# Patient Record
Sex: FEMALE | Race: WHITE | NOT HISPANIC OR LATINO | Employment: UNEMPLOYED | URBAN - METROPOLITAN AREA
[De-identification: names, ages, dates, MRNs, and addresses within clinical notes are randomized per-mention and may not be internally consistent; named-entity substitution may affect disease eponyms.]

---

## 2020-12-07 NOTE — PROGRESS NOTES
"Subjective   Chief Complaint   Patient presents with   • Fatigue   • Chest Pain   • Joint Pain   • Shortness of Breath   • Illness     POST-COVID       History of Present Illness   30 y.o. female presents as a new patient to establish care and discuss multiple issues starting after COVID infection in March. She had myalgias and arthralgias with initial infection that have unfortunately continued. In April she developed dyspnea and CP--went to ER--negative CXR normal. Dyspnea and CP continued worsening in May with pain radiating down her left arm so returned to ER and subsequently had 2D echo and nuclear stress test negative for ischemia. \"Feels like I have lacerations across my lungs sometimes.\" Chest pain and dyspnea have improved since then but not resolved. Had lab evaluation in September with previous PCP. Advised she was VD deficient. Now taking it OTC. Also has sinus congestion and pain in her ears--\"like a sore throat but in my ears.\" Pain in chest worse with dyspnea. 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      There is no problem list on file for this patient.      Allergies   Allergen Reactions   • Penicillins Rash       No current outpatient medications on file prior to visit.     No current facility-administered medications on file prior to visit.        Past Medical History:   Diagnosis Date   • Hashimoto's disease    • Hashimoto's thyroiditis        Family History   Problem Relation Age of Onset   • Cancer Father    • Heart disease Maternal Grandmother    • Heart disease Maternal Grandfather        Social History     Socioeconomic History   • Marital status:      Spouse name: Not on file   • Number of children: Not on file   • Years of education: Not on file   • Highest education level: Not on file   Tobacco Use   • Smoking status: Never Smoker   • Smokeless tobacco: Never Used   Substance and Sexual Activity   • Alcohol use: Never     Frequency: Never " "      Past Surgical History:   Procedure Laterality Date   •  SECTION         The following portions of the patient's history were reviewed and updated as appropriate: problem list, allergies, current medications, past medical history, past family history, past social history and past surgical history.    Review of Systems   Constitutional: Positive for fatigue. Negative for chills and fever.   HENT: Positive for ear pain.    Eyes: Negative.    Respiratory: Positive for shortness of breath. Negative for cough, chest tightness and wheezing.    Cardiovascular: Positive for chest pain.   Endocrine: Negative.    Musculoskeletal: Positive for arthralgias and myalgias.   Skin: Negative.    Neurological: Negative for weakness.   Hematological: Does not bruise/bleed easily.   Psychiatric/Behavioral: Negative.        Immunization History   Administered Date(s) Administered   • Flu Vaccine Quad PF >36MO 2018   • Rho (D) Immune Globulin 2018, 09/15/2018, 09/10/2019, 2019   • Tdap 2018       Objective   Vitals:    12/10/20 0930 12/10/20 1003   BP:  98/60   Pulse:  84   Resp:  12   Temp: 97.3 °F (36.3 °C)    Weight: 47.6 kg (105 lb)    Height: 152.4 cm (60\")      Body mass index is 20.51 kg/m².  Physical Exam  Vitals signs reviewed.   Constitutional:       General: She is not in acute distress.     Appearance: Normal appearance. She is normal weight. She is not ill-appearing.   HENT:      Head: Normocephalic and atraumatic.      Right Ear: Tympanic membrane, ear canal and external ear normal.      Left Ear: Tympanic membrane, ear canal and external ear normal.      Nose: Nose normal.      Mouth/Throat:      Mouth: Mucous membranes are moist.      Pharynx: Oropharynx is clear. No oropharyngeal exudate or posterior oropharyngeal erythema.      Comments: +PND.   Neck:      Musculoskeletal: Neck supple.      Thyroid: No thyromegaly.   Cardiovascular:      Rate and Rhythm: Normal rate and regular " rhythm.      Heart sounds: Normal heart sounds.   Pulmonary:      Effort: Pulmonary effort is normal.      Breath sounds: Normal breath sounds.   Musculoskeletal:      Comments: Ambulates without assistance; no clubbing, cyanosis or edema.    Lymphadenopathy:      Cervical: No cervical adenopathy.   Skin:     General: Skin is warm and dry.   Neurological:      Mental Status: She is alert.   Psychiatric:         Mood and Affect: Mood normal.         Behavior: Behavior normal.         Procedures    Assessment/Plan   Diagnoses and all orders for this visit:    1. Chest pain, unspecified type (Primary)  Comments:  Negative ischemic work up in May at [facility] with 2D echo and nuclear stress test. Negative CXR. Consider CT chest after reviewing labs.   Orders:  -     D-dimer, Quantitative    2. Need for hepatitis C screening test  -     Hepatitis C Antibody    3. Arthralgia, unspecified joint  -     MARCELA by IFA, Reflex 9-biomarkers profile  -     Sedimentation Rate  -     Rheumatoid Factor  -     C-reactive Protein  -     Cyclic Citrul Peptide Antibody, IgG / IgA    4. Fatigue, unspecified type  -     MARCELA by IFA, Reflex 9-biomarkers profile  -     Sedimentation Rate  -     Rheumatoid Factor  -     C-reactive Protein  -     Cyclic Citrul Peptide Antibody, IgG / IgA    5. Vitamin D deficiency  -     Vitamin D 25 Hydroxy    6. Influenza vaccination declined by patient    7. Hashimoto's thyroiditis  -     TSH    Further MDM pending labs. Discussed possible CT chest as well as cardiology consult but also consider post_COVID clinic.     Records reviewed.     Return in about 1 month (around 1/10/2021) for Lab Today.

## 2020-12-10 ENCOUNTER — OFFICE VISIT (OUTPATIENT)
Dept: INTERNAL MEDICINE | Facility: CLINIC | Age: 30
End: 2020-12-10

## 2020-12-10 VITALS
SYSTOLIC BLOOD PRESSURE: 98 MMHG | HEART RATE: 84 BPM | DIASTOLIC BLOOD PRESSURE: 60 MMHG | TEMPERATURE: 97.3 F | RESPIRATION RATE: 12 BRPM | WEIGHT: 105 LBS | BODY MASS INDEX: 20.62 KG/M2 | HEIGHT: 60 IN

## 2020-12-10 DIAGNOSIS — M25.50 ARTHRALGIA, UNSPECIFIED JOINT: ICD-10-CM

## 2020-12-10 DIAGNOSIS — R53.83 FATIGUE, UNSPECIFIED TYPE: ICD-10-CM

## 2020-12-10 DIAGNOSIS — E55.9 VITAMIN D DEFICIENCY: ICD-10-CM

## 2020-12-10 DIAGNOSIS — R06.00 DYSPNEA, UNSPECIFIED TYPE: ICD-10-CM

## 2020-12-10 DIAGNOSIS — Z28.21 INFLUENZA VACCINATION DECLINED BY PATIENT: ICD-10-CM

## 2020-12-10 DIAGNOSIS — Z11.59 NEED FOR HEPATITIS C SCREENING TEST: ICD-10-CM

## 2020-12-10 DIAGNOSIS — E06.3 HASHIMOTO'S THYROIDITIS: ICD-10-CM

## 2020-12-10 DIAGNOSIS — R07.9 CHEST PAIN, UNSPECIFIED TYPE: Primary | ICD-10-CM

## 2020-12-10 PROCEDURE — 99204 OFFICE O/P NEW MOD 45 MIN: CPT | Performed by: NURSE PRACTITIONER

## 2020-12-13 DIAGNOSIS — R07.9 CHEST PAIN, UNSPECIFIED TYPE: Primary | ICD-10-CM

## 2020-12-13 DIAGNOSIS — E55.9 VITAMIN D DEFICIENCY: ICD-10-CM

## 2020-12-13 LAB
25(OH)D3+25(OH)D2 SERPL-MCNC: 25.2 NG/ML (ref 30–100)
ANA TITR SER IF: NEGATIVE {TITER}
CCP IGA+IGG SERPL IA-ACNC: 6 UNITS (ref 0–19)
CRP SERPL-MCNC: <0.03 MG/DL (ref 0–0.5)
D DIMER PPP FEU-MCNC: <0.27 MCGFEU/ML (ref 0–0.49)
ERYTHROCYTE [SEDIMENTATION RATE] IN BLOOD BY WESTERGREN METHOD: 2 MM/HR (ref 0–20)
HCV AB S/CO SERPL IA: 0.2 S/CO RATIO (ref 0–0.9)
LABORATORY COMMENT REPORT: NORMAL
RHEUMATOID FACT SERPL-ACNC: <10 IU/ML (ref 0–13.9)
TSH SERPL DL<=0.005 MIU/L-ACNC: 1.12 UIU/ML (ref 0.27–4.2)

## 2020-12-13 RX ORDER — ERGOCALCIFEROL 1.25 MG/1
50000 CAPSULE ORAL WEEKLY
Qty: 13 CAPSULE | Refills: 0 | Status: SHIPPED | OUTPATIENT
Start: 2020-12-13 | End: 2021-01-11 | Stop reason: SDUPTHER

## 2020-12-22 ENCOUNTER — HOSPITAL ENCOUNTER (OUTPATIENT)
Dept: CT IMAGING | Facility: HOSPITAL | Age: 30
Discharge: HOME | End: 2020-12-22

## 2020-12-22 ENCOUNTER — APPOINTMENT (OUTPATIENT)
Dept: OTHER | Facility: HOSPITAL | Age: 30
End: 2020-12-22

## 2020-12-22 DIAGNOSIS — Z09 FOLLOW UP: ICD-10-CM

## 2020-12-22 DIAGNOSIS — R07.9 CHEST PAIN, UNSPECIFIED TYPE: ICD-10-CM

## 2020-12-22 PROCEDURE — 71250 CT THORAX DX C-: CPT

## 2020-12-27 DIAGNOSIS — R91.1 LUNG NODULE: Primary | ICD-10-CM

## 2021-01-10 NOTE — PROGRESS NOTES
Subjective   Chief Complaint   Patient presents with   • Chest Pain     Following COVID non cardiac   • Shortness of Breath     Following COVID       History of Present Illness   30 y.o. female presents for follow up regarding chest pain, dyspnea, myalgias and arthralgias ongoing since having COVID in 2020. Negative ischemic work up in May with NH. Negative CXR at previous visit in December and lab work unremarkable with exception of Vitamin D level 25. CT chest revealed tiny 6 mm nodular density in LLL thought to be infectious or inflammatory; repeat imaging in 6 months.     Chest pain and shortness of breath better but not resolved. Denies heartburn. Tried TUMs several days but did not make a difference in her pain. Denies history of asthma. Never had PFTs. She is currently breastfeeding.      There is no problem list on file for this patient.      Allergies   Allergen Reactions   • Penicillins Rash       Current Outpatient Medications on File Prior to Visit   Medication Sig Dispense Refill   • [DISCONTINUED] vitamin D (ERGOCALCIFEROL) 1.25 MG (28984 UT) capsule capsule Take 1 capsule by mouth 1 (One) Time Per Week. 13 capsule 0     No current facility-administered medications on file prior to visit.        Past Medical History:   Diagnosis Date   • Hashimoto's disease    • Hashimoto's thyroiditis        Family History   Problem Relation Age of Onset   • Cancer Father    • Heart disease Maternal Grandmother    • Heart disease Maternal Grandfather        Social History     Socioeconomic History   • Marital status:      Spouse name: Not on file   • Number of children: Not on file   • Years of education: Not on file   • Highest education level: Not on file   Tobacco Use   • Smoking status: Never Smoker   • Smokeless tobacco: Never Used   Substance and Sexual Activity   • Alcohol use: Never     Frequency: Never       Past Surgical History:   Procedure Laterality Date   •  SECTION         The  "following portions of the patient's history were reviewed and updated as appropriate: problem list, allergies, current medications, past medical history and past social history.    Review of Systems    Immunization History   Administered Date(s) Administered   • Flu Vaccine Quad PF >36MO 09/06/2018   • Rho (D) Immune Globulin 07/31/2018, 09/15/2018, 09/10/2019, 12/06/2019   • Tdap 07/17/2018       Objective   Vitals:    01/11/21 0940 01/11/21 1000   BP:  94/60   Pulse:  76   Resp:  12   Temp: 97.5 °F (36.4 °C)    Weight: 49 kg (108 lb)    Height: 152.4 cm (60\")      Body mass index is 21.09 kg/m².  Physical Exam  Vitals signs reviewed.   Constitutional:       Appearance: Normal appearance. She is well-developed.   HENT:      Head: Normocephalic and atraumatic.   Cardiovascular:      Rate and Rhythm: Normal rate and regular rhythm.      Heart sounds: Normal heart sounds, S1 normal and S2 normal.   Pulmonary:      Effort: Pulmonary effort is normal.      Breath sounds: Normal breath sounds.   Skin:     General: Skin is warm and dry.   Neurological:      Mental Status: She is alert.   Psychiatric:         Mood and Affect: Mood normal.         Behavior: Behavior normal.         Procedures    Assessment/Plan   Diagnoses and all orders for this visit:    1. History of COVID-19 (Primary)  Comments:  She will schedule with the COVID clinic at [place]--phone number provided.     2. Other chest pain  Comments:  Cardiac work up negative for ischemia. Chest CT 6 mm groundglass nodular density Trial of TUMs did not make a difference. Repeat Chest CT in 6 months.     3. Vitamin D deficiency  Comments:  Recheck V-D level in 3 months--order previously placed.   Orders:  -     vitamin D (ERGOCALCIFEROL) 1.25 MG (37710 UT) capsule capsule; Take 1 capsule by mouth 1 (One) Time Per Week.  Dispense: 13 capsule; Refill: 0    4. Dyspnea, unspecified type  Comments:  Advised PFTs--she will consider and let me know what she decides. "     Records reviewed include previous OV with myself as well as labs, imaging and cardiac studies.     Return in about 6 months (around 7/11/2021).

## 2021-01-10 NOTE — PATIENT INSTRUCTIONS
There is a COVID clinic at [place] [phone]. Call and look into scheduling an appointment with them.

## 2021-01-11 ENCOUNTER — OFFICE VISIT (OUTPATIENT)
Dept: INTERNAL MEDICINE | Facility: CLINIC | Age: 31
End: 2021-01-11

## 2021-01-11 VITALS
SYSTOLIC BLOOD PRESSURE: 94 MMHG | WEIGHT: 108 LBS | BODY MASS INDEX: 21.2 KG/M2 | TEMPERATURE: 97.5 F | DIASTOLIC BLOOD PRESSURE: 60 MMHG | HEART RATE: 76 BPM | RESPIRATION RATE: 12 BRPM | HEIGHT: 60 IN

## 2021-01-11 DIAGNOSIS — R07.89 OTHER CHEST PAIN: ICD-10-CM

## 2021-01-11 DIAGNOSIS — E55.9 VITAMIN D DEFICIENCY: ICD-10-CM

## 2021-01-11 DIAGNOSIS — Z86.16 HISTORY OF COVID-19: Primary | ICD-10-CM

## 2021-01-11 DIAGNOSIS — R06.00 DYSPNEA, UNSPECIFIED TYPE: ICD-10-CM

## 2021-01-11 PROCEDURE — 99214 OFFICE O/P EST MOD 30 MIN: CPT | Performed by: NURSE PRACTITIONER

## 2021-01-11 RX ORDER — ERGOCALCIFEROL 1.25 MG/1
50000 CAPSULE ORAL WEEKLY
Qty: 13 CAPSULE | Refills: 0 | Status: SHIPPED | OUTPATIENT
Start: 2021-01-11

## 2021-05-11 ENCOUNTER — TELEPHONE (OUTPATIENT)
Dept: INTERNAL MEDICINE | Facility: CLINIC | Age: 31
End: 2021-05-11

## 2021-05-11 NOTE — TELEPHONE ENCOUNTER
Caller: Mirlande Landa    Relationship to patient: Self    Best call back number:[phone]    Patient is needing: PATIENT DONATED BLOOD MAY 4, 2021. WHEN NEEDLE WAS INSERTED IN RIGHT ARM, PATIENT FELT PAIN SHOOT UP HER ARM. PATIENT'S ARM AND WRIST ARE STILL PAINFUL, MOSTLY WRIST. PATIENT IS GUESSING NERVE DAMAGE, STATES THAT IT FEELS VERY SIMILAR TO CARPAL TUNNEL.    PATIENT IS ASKING FOR ADVICE ON WHAT HER NEXT STEP SHOULD BE.    PLEASE CALL

## 2021-05-11 NOTE — TELEPHONE ENCOUNTER
Per Marimar patient would need to be seen. I called patient and lm for her to schedule with Marimar tomorrow or Alycia Thursday.

## 2021-05-28 ENCOUNTER — TELEPHONE (OUTPATIENT)
Dept: INTERNAL MEDICINE | Facility: CLINIC | Age: 31
End: 2021-05-28

## 2021-06-01 DIAGNOSIS — R91.1 LUNG NODULE: Primary | ICD-10-CM

## 2021-06-03 ENCOUNTER — APPOINTMENT (OUTPATIENT)
Dept: CT IMAGING | Facility: HOSPITAL | Age: 31
End: 2021-06-03

## 2021-06-23 ENCOUNTER — HOSPITAL ENCOUNTER (OUTPATIENT)
Dept: CT IMAGING | Facility: HOSPITAL | Age: 31
Discharge: HOME | End: 2021-06-23
Admitting: NURSE PRACTITIONER

## 2021-06-23 DIAGNOSIS — R91.1 LUNG NODULE: ICD-10-CM

## 2021-06-23 PROCEDURE — 71250 CT THORAX DX C-: CPT

## 2021-07-11 PROBLEM — U09.9 POST-COVID SYNDROME: Status: ACTIVE | Noted: 2021-07-11

## 2021-07-11 PROBLEM — E55.9 VITAMIN D DEFICIENCY: Status: ACTIVE | Noted: 2021-07-11

## 2021-08-04 NOTE — PROGRESS NOTES
Subjective   Chief Complaint   Patient presents with   • Vitamin D Deficiency   • Fatigue   • Muscle Pain       History of Present Illness   30 y.o. female presents for follow up regarding dyspnea after COVID PNA as well as chest pain and VD. Negative cardiac work up. Chest CT at the time revealed 6 mm ground glass nodule and advised to have chest CT repeated in 6 months which showed stability; advised to have repeated in one year. Started on weekly VD. Referred to COVID clinic with [facility] but never went.    Fatigue comes and goes. Using [employer] work out videos 5-7 days a week on a good week; when she overdoes it she is in the bed the following week. Never feels rested when she wakes up. Reports she is a light sleeper and is frequently awakened by neighbors as she lives in an [facility]. No snoring. No witnessed apnea.     Tension HA frequently. Not severe but noticeable. Relieved with Tyelnol. Increased mylagias. Sometimes takes a Tylenol which helps  as does a hot bath with Epsom salts.     Feels as though her mood is good. Denies anxiety or depression. Really enjoys her children.     Repeat chest CT one year LLL groundglass nodule stable.     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     Patient Active Problem List   Diagnosis   • Vitamin D deficiency   • Post-COVID syndrome       Allergies   Allergen Reactions   • Penicillins Rash       Current Outpatient Medications on File Prior to Visit   Medication Sig Dispense Refill   • vitamin D (ERGOCALCIFEROL) 1.25 MG (05974 UT) capsule capsule Take 1 capsule by mouth 1 (One) Time Per Week. 13 capsule 0     No current facility-administered medications on file prior to visit.       Past Medical History:   Diagnosis Date   • Atypical chest pain 2020   • COVID-19 2020   • Hashimoto's thyroiditis    • Molar pregnancy        Family History   Problem Relation Age of  "Onset   • Cancer Father    • Heart disease Maternal Grandmother    • Heart disease Maternal Grandfather        Social History     Socioeconomic History   • Marital status:      Spouse name: Not on file   • Number of children: Not on file   • Years of education: Not on file   • Highest education level: Not on file   Tobacco Use   • Smoking status: Never Smoker   • Smokeless tobacco: Never Used   Substance and Sexual Activity   • Alcohol use: Never       Past Surgical History:   Procedure Laterality Date   •  SECTION         The following portions of the patient's history were reviewed and updated as appropriate: problem list, allergies, current medications, past medical history and past social history.    Review of Systems    Immunization History   Administered Date(s) Administered   • Flu Vaccine Quad PF >36MO 2018   • Rho (D) Immune Globulin 2018, 09/15/2018, 09/10/2019, 2019   • Tdap 2018       Objective   Vitals:    21 0829   BP: 98/60   Pulse: 84   Resp: 14   Temp: 97.8 °F (36.6 °C)   Weight: 49 kg (108 lb)   Height: 152.4 cm (60\")     Body mass index is 21.09 kg/m².  Physical Exam  Vitals reviewed.   Constitutional:       Appearance: Normal appearance. She is well-developed and normal weight.   HENT:      Head: Normocephalic and atraumatic.      Mouth/Throat:      Mouth: Mucous membranes are moist.      Pharynx: Oropharynx is clear. No oropharyngeal exudate or posterior oropharyngeal erythema.   Neck:      Thyroid: No thyroid mass, thyromegaly or thyroid tenderness.   Cardiovascular:      Rate and Rhythm: Normal rate and regular rhythm.      Heart sounds: Normal heart sounds, S1 normal and S2 normal.   Pulmonary:      Effort: Pulmonary effort is normal.      Breath sounds: Normal breath sounds.   Musculoskeletal:      Cervical back: Neck supple.      Comments: Ambulates without assistance; no clubbing, cyanosis or edema.    Lymphadenopathy:      Cervical: No cervical " adenopathy.   Skin:     General: Skin is warm and dry.   Neurological:      Mental Status: She is alert.   Psychiatric:         Mood and Affect: Mood normal.         Behavior: Behavior normal.         Procedures    Assessment/Plan   Diagnoses and all orders for this visit:    1. Fatigue, unspecified type (Primary)  Comments:  Chronic since COVID-19 in March 2020.    Orders:  -     CBC (No Diff); Future  -     Comprehensive Metabolic Panel; Future  -     TSH; Future  -     T4, free; Future    2. Daytime sleepiness  Comments:  Sleep study advised.   Orders:  -     Ambulatory Referral to Sleep Medicine    3. Myalgia  Comments:  Relieved with Tylenol when she takes it as well as Epsom salts.   Orders:  -     Vitamin D 25 Hydroxy; Future  -     Comprehensive Metabolic Panel; Future  -     TSH; Future  -     T4, free; Future  -     CK; Future    4. Vitamin D deficiency  -     Vitamin D 25 Hydroxy; Future  -     CK; Future    5. Tension headache  Comments:  Relieved with one Tylenol when she takes it. Worse with stress.     6. Lung nodule  -     CT Chest Without Contrast Diagnostic; Future    Records reviewed include previous OV with myself as well as labs.     Return in about 6 months (around 2/6/2022) for Lab Today, Annual physical.

## 2021-08-06 ENCOUNTER — OFFICE VISIT (OUTPATIENT)
Dept: INTERNAL MEDICINE | Facility: CLINIC | Age: 31
End: 2021-08-06

## 2021-08-06 VITALS
RESPIRATION RATE: 14 BRPM | DIASTOLIC BLOOD PRESSURE: 60 MMHG | WEIGHT: 108 LBS | TEMPERATURE: 97.8 F | HEIGHT: 60 IN | SYSTOLIC BLOOD PRESSURE: 98 MMHG | HEART RATE: 84 BPM | BODY MASS INDEX: 21.2 KG/M2

## 2021-08-06 DIAGNOSIS — E55.9 VITAMIN D DEFICIENCY: ICD-10-CM

## 2021-08-06 DIAGNOSIS — M79.10 MYALGIA: ICD-10-CM

## 2021-08-06 DIAGNOSIS — R91.1 LUNG NODULE: ICD-10-CM

## 2021-08-06 DIAGNOSIS — R40.0 DAYTIME SLEEPINESS: ICD-10-CM

## 2021-08-06 DIAGNOSIS — G44.209 TENSION HEADACHE: ICD-10-CM

## 2021-08-06 DIAGNOSIS — R53.83 FATIGUE, UNSPECIFIED TYPE: Primary | Chronic | ICD-10-CM

## 2021-08-06 PROCEDURE — 99214 OFFICE O/P EST MOD 30 MIN: CPT | Performed by: NURSE PRACTITIONER

## 2022-06-23 ENCOUNTER — APPOINTMENT (OUTPATIENT)
Dept: CT IMAGING | Facility: HOSPITAL | Age: 32
End: 2022-06-23

## 2022-07-12 NOTE — PROGRESS NOTES
Subjective   Chief Complaint   Patient presents with   • Cough   • Fatigue       History of Present Illness   31 y.o. female presents with cc cough and fatigue ongoing times two weeks; she is 24 months pregnant. Reports started with fatigue and cough which progressively got worse culminating with Tmax 100.4 last week. Notes while she is coughing and still fatigued here breathing is better and she has been afebrile for a week now. PC but not checking her sputum for color. Continues Tylenol as needed but nothing else OTC.      Previous history of COVID at the beginning of the pandemic with subsequent long-haulers. Never got vaccinated.      Patient Active Problem List   Diagnosis   • Vitamin D deficiency   • Post-COVID syndrome       Allergies   Allergen Reactions   • Penicillins Rash       Current Outpatient Medications on File Prior to Visit   Medication Sig Dispense Refill   • vitamin D (ERGOCALCIFEROL) 1.25 MG (52930 UT) capsule capsule Take 1 capsule by mouth 1 (One) Time Per Week. 13 capsule 0     No current facility-administered medications on file prior to visit.       Past Medical History:   Diagnosis Date   • Atypical chest pain    • COVID-19    • Hashimoto's thyroiditis    • Molar pregnancy        Family History   Problem Relation Age of Onset   • Cancer Father    • Heart disease Maternal Grandmother    • Heart disease Maternal Grandfather        Social History     Socioeconomic History   • Marital status:    Tobacco Use   • Smoking status: Never Smoker   • Smokeless tobacco: Never Used   Substance and Sexual Activity   • Alcohol use: Never       Past Surgical History:   Procedure Laterality Date   •  SECTION         The following portions of the patient's history were reviewed and updated as appropriate: problem list, allergies, current medications, past medical history and past social history.    Review of Systems    Immunization History   Administered Date(s) Administered   • Flu  "Vaccine Quad PF >36MO 09/06/2018   • Rho (D) Immune Globulin 07/31/2018, 09/15/2018, 09/10/2019, 12/06/2019   • Tdap 07/17/2018       Objective   Vitals:    07/14/22 1022   BP: 100/60   Pulse: 102   Resp: 16   Temp: 98.5 °F (36.9 °C)   SpO2: 98%   Height: 152.4 cm (60\")     Body mass index is 21.09 kg/m².  Physical Exam  Constitutional:       Appearance: Normal appearance.   HENT:      Head: Normocephalic and atraumatic.      Nose: Congestion present.      Mouth/Throat:      Mouth: Mucous membranes are moist.      Pharynx: Oropharynx is clear. No oropharyngeal exudate or posterior oropharyngeal erythema.      Comments: +PND.   Cardiovascular:      Rate and Rhythm: Normal rate and regular rhythm.      Heart sounds: Normal heart sounds.   Pulmonary:      Effort: Pulmonary effort is normal.      Breath sounds: Normal breath sounds.   Musculoskeletal:      Cervical back: Neck supple.   Lymphadenopathy:      Cervical: No cervical adenopathy.   Neurological:      Mental Status: She is alert.   Psychiatric:         Mood and Affect: Mood normal.         Behavior: Behavior normal.         Procedures    Assessment & Plan   Diagnoses and all orders for this visit:    1. Acute URI (Primary)  Comments:  Likely viral. COVID and Flu negatrive. Likely this is viral as symptoms peaked last week and she is starting to feel better. Symptomatic treatment with Mucinex and Ocean spray if ok with GYN.   Orders:  -     POCT SARS-CoV-2 Antigen SINA    Return for Next scheduled follow up.           "

## 2022-07-14 ENCOUNTER — OFFICE VISIT (OUTPATIENT)
Dept: INTERNAL MEDICINE | Facility: CLINIC | Age: 32
End: 2022-07-14

## 2022-07-14 VITALS
OXYGEN SATURATION: 98 % | HEART RATE: 102 BPM | RESPIRATION RATE: 16 BRPM | TEMPERATURE: 98.5 F | SYSTOLIC BLOOD PRESSURE: 100 MMHG | BODY MASS INDEX: 21.09 KG/M2 | HEIGHT: 60 IN | DIASTOLIC BLOOD PRESSURE: 60 MMHG

## 2022-07-14 DIAGNOSIS — J06.9 ACUTE URI: Primary | ICD-10-CM

## 2022-07-14 LAB
EXPIRATION DATE: NORMAL
INTERNAL CONTROL: NORMAL
Lab: NORMAL
SARS-COV-2 AG UPPER RESP QL IA.RAPID: NOT DETECTED

## 2022-07-14 PROCEDURE — 99213 OFFICE O/P EST LOW 20 MIN: CPT | Performed by: NURSE PRACTITIONER

## 2022-07-14 PROCEDURE — 87426 SARSCOV CORONAVIRUS AG IA: CPT | Performed by: NURSE PRACTITIONER

## 2023-03-08 ENCOUNTER — OFFICE VISIT (OUTPATIENT)
Dept: INTERNAL MEDICINE | Facility: CLINIC | Age: 33
End: 2023-03-08
Payer: COMMERCIAL

## 2023-03-08 VITALS — OXYGEN SATURATION: 99 % | TEMPERATURE: 99.5 F | HEART RATE: 100 BPM

## 2023-03-08 DIAGNOSIS — J02.9 SORE THROAT: Primary | ICD-10-CM

## 2023-03-08 DIAGNOSIS — J02.0 STREP PHARYNGITIS: ICD-10-CM

## 2023-03-08 LAB
EXPIRATION DATE: ABNORMAL
INTERNAL CONTROL: ABNORMAL
Lab: ABNORMAL
S PYO AG THROAT QL: POSITIVE

## 2023-03-08 PROCEDURE — 99213 OFFICE O/P EST LOW 20 MIN: CPT | Performed by: PHYSICIAN ASSISTANT

## 2023-03-08 PROCEDURE — 87880 STREP A ASSAY W/OPTIC: CPT | Performed by: PHYSICIAN ASSISTANT

## 2023-03-08 RX ORDER — AZITHROMYCIN 250 MG/1
TABLET, FILM COATED ORAL
Qty: 6 TABLET | Refills: 0 | Status: SHIPPED | OUTPATIENT
Start: 2023-03-08

## 2023-03-08 NOTE — PROGRESS NOTES
Subjective   Chief Complaint   Patient presents with   • Fever   • Sore Throat   • Generalized Body Aches       History of Present Illness     Pt started with fever, sore throat and body aches yesterday. Exposed to strep 2 days ago. Has had painful swallowing. Has allergy to penicillins. Is currently breastfeeding. Slight cough, rarely     Patient Active Problem List   Diagnosis   • Vitamin D deficiency   • Post-COVID syndrome       Allergies   Allergen Reactions   • Penicillins Rash       Current Outpatient Medications on File Prior to Visit   Medication Sig Dispense Refill   • vitamin D (ERGOCALCIFEROL) 1.25 MG (06945 UT) capsule capsule Take 1 capsule by mouth 1 (One) Time Per Week. 13 capsule 0     No current facility-administered medications on file prior to visit.       Past Medical History:   Diagnosis Date   • Atypical chest pain    • COVID-19    • Hashimoto's thyroiditis    • Molar pregnancy        Family History   Problem Relation Age of Onset   • Cancer Father    • Heart disease Maternal Grandmother    • Heart disease Maternal Grandfather        Social History     Socioeconomic History   • Marital status:    Tobacco Use   • Smoking status: Never   • Smokeless tobacco: Never   Substance and Sexual Activity   • Alcohol use: Never       Past Surgical History:   Procedure Laterality Date   •  SECTION           The following portions of the patient's history were reviewed and updated as appropriate: problem list, allergies, current medications, past medical history, past family history, past social history and past surgical history.    ROS    See HPI  Immunization History   Administered Date(s) Administered   • Flu Vaccine Quad PF >36MO 2018   • Rho (D) Immune Globulin 2018, 09/15/2018, 09/10/2019, 2019   • Tdap 2018       Objective   Vitals:    23 1416   Pulse: 100   Temp: 99.5 °F (37.5 °C)   SpO2: 99%     There is no height or weight on file to calculate  BMI.  Physical Exam  Vitals reviewed.   Constitutional:       Appearance: Normal appearance.   HENT:      Head: Normocephalic and atraumatic.      Mouth/Throat:      Tonsils: No tonsillar exudate. 1+ on the right. 1+ on the left.   Pulmonary:      Effort: Pulmonary effort is normal.      Breath sounds: Normal breath sounds.   Neurological:      Mental Status: She is alert.       Assessment & Plan   Diagnoses and all orders for this visit:    1. Sore throat (Primary)  -     POCT rapid strep A    2. Strep pharyngitis  -     azithromycin (Zithromax) 250 MG tablet; Take 2 tablets on day 1, then 1 tablet daily until complete  Dispense: 6 tablet; Refill: 0

## 2023-08-26 ENCOUNTER — ANESTHESIA EVENT (OUTPATIENT)
Dept: PERIOP | Facility: HOSPITAL | Age: 33
End: 2023-08-26
Payer: COMMERCIAL

## 2023-08-26 ENCOUNTER — ANESTHESIA (OUTPATIENT)
Dept: PERIOP | Facility: HOSPITAL | Age: 33
End: 2023-08-26
Payer: COMMERCIAL

## 2023-08-26 ENCOUNTER — APPOINTMENT (OUTPATIENT)
Dept: CT IMAGING | Facility: HOSPITAL | Age: 33
End: 2023-08-26
Payer: COMMERCIAL

## 2023-08-26 ENCOUNTER — HOSPITAL ENCOUNTER (OUTPATIENT)
Facility: HOSPITAL | Age: 33
Discharge: HOME | End: 2023-08-27
Attending: EMERGENCY MEDICINE | Admitting: SURGERY
Payer: COMMERCIAL

## 2023-08-26 DIAGNOSIS — K35.80 ACUTE APPENDICITIS, UNSPECIFIED ACUTE APPENDICITIS TYPE: Primary | ICD-10-CM

## 2023-08-26 LAB
ALBUMIN SERPL-MCNC: 5 G/DL (ref 3.5–5.2)
ALBUMIN/GLOB SERPL: 1.8 G/DL
ALP SERPL-CCNC: 63 U/L (ref 39–117)
ALT SERPL W P-5'-P-CCNC: 16 U/L (ref 1–33)
ANION GAP SERPL CALCULATED.3IONS-SCNC: 14.2 MMOL/L (ref 5–15)
AST SERPL-CCNC: 14 U/L (ref 1–32)
B-HCG UR QL: NEGATIVE
BASOPHILS # BLD AUTO: 0.02 10*3/MM3 (ref 0–0.2)
BASOPHILS NFR BLD AUTO: 0.1 % (ref 0–1.5)
BILIRUB SERPL-MCNC: 0.5 MG/DL (ref 0–1.2)
BILIRUB UR QL STRIP: NEGATIVE
BUN SERPL-MCNC: 9 MG/DL (ref 6–20)
BUN/CREAT SERPL: 17.3 (ref 7–25)
CALCIUM SPEC-SCNC: 9.4 MG/DL (ref 8.6–10.5)
CHLORIDE SERPL-SCNC: 103 MMOL/L (ref 98–107)
CLARITY UR: CLEAR
CO2 SERPL-SCNC: 20.8 MMOL/L (ref 22–29)
COLOR UR: YELLOW
CREAT SERPL-MCNC: 0.52 MG/DL (ref 0.57–1)
DEPRECATED RDW RBC AUTO: 39.2 FL (ref 37–54)
EGFRCR SERPLBLD CKD-EPI 2021: 125.2 ML/MIN/1.73
EOSINOPHIL # BLD AUTO: 0 10*3/MM3 (ref 0–0.4)
EOSINOPHIL NFR BLD AUTO: 0 % (ref 0.3–6.2)
ERYTHROCYTE [DISTWIDTH] IN BLOOD BY AUTOMATED COUNT: 12.2 % (ref 12.3–15.4)
GLOBULIN UR ELPH-MCNC: 2.8 GM/DL
GLUCOSE SERPL-MCNC: 107 MG/DL (ref 65–99)
GLUCOSE UR STRIP-MCNC: NEGATIVE MG/DL
HCG SERPL QL: NEGATIVE
HCT VFR BLD AUTO: 40.8 % (ref 34–46.6)
HGB BLD-MCNC: 13.5 G/DL (ref 12–15.9)
HGB UR QL STRIP.AUTO: NEGATIVE
HOLD SPECIMEN: NORMAL
HOLD SPECIMEN: NORMAL
IMM GRANULOCYTES # BLD AUTO: 0.08 10*3/MM3 (ref 0–0.05)
IMM GRANULOCYTES NFR BLD AUTO: 0.5 % (ref 0–0.5)
KETONES UR QL STRIP: ABNORMAL
LEUKOCYTE ESTERASE UR QL STRIP.AUTO: NEGATIVE
LIPASE SERPL-CCNC: 22 U/L (ref 13–60)
LYMPHOCYTES # BLD AUTO: 0.69 10*3/MM3 (ref 0.7–3.1)
LYMPHOCYTES NFR BLD AUTO: 3.9 % (ref 19.6–45.3)
MCH RBC QN AUTO: 28.7 PG (ref 26.6–33)
MCHC RBC AUTO-ENTMCNC: 33.1 G/DL (ref 31.5–35.7)
MCV RBC AUTO: 86.6 FL (ref 79–97)
MONOCYTES # BLD AUTO: 0.92 10*3/MM3 (ref 0.1–0.9)
MONOCYTES NFR BLD AUTO: 5.3 % (ref 5–12)
NEUTROPHILS NFR BLD AUTO: 15.8 10*3/MM3 (ref 1.7–7)
NEUTROPHILS NFR BLD AUTO: 90.2 % (ref 42.7–76)
NITRITE UR QL STRIP: NEGATIVE
NRBC BLD AUTO-RTO: 0 /100 WBC (ref 0–0.2)
PH UR STRIP.AUTO: 5.5 [PH] (ref 5–8)
PLATELET # BLD AUTO: 200 10*3/MM3 (ref 140–450)
PMV BLD AUTO: 11.1 FL (ref 6–12)
POTASSIUM SERPL-SCNC: 3.9 MMOL/L (ref 3.5–5.2)
PROT SERPL-MCNC: 7.8 G/DL (ref 6–8.5)
PROT UR QL STRIP: NEGATIVE
RBC # BLD AUTO: 4.71 10*6/MM3 (ref 3.77–5.28)
SODIUM SERPL-SCNC: 138 MMOL/L (ref 136–145)
SP GR UR STRIP: 1.02 (ref 1–1.03)
UROBILINOGEN UR QL STRIP: ABNORMAL
WBC NRBC COR # BLD: 17.51 10*3/MM3 (ref 3.4–10.8)
WHOLE BLOOD HOLD COAG: NORMAL
WHOLE BLOOD HOLD SPECIMEN: NORMAL

## 2023-08-26 PROCEDURE — 25010000002 PROPOFOL 10 MG/ML EMULSION: Performed by: ANESTHESIOLOGY

## 2023-08-26 PROCEDURE — 25010000002 MIDAZOLAM PER 1 MG: Performed by: ANESTHESIOLOGY

## 2023-08-26 PROCEDURE — G0378 HOSPITAL OBSERVATION PER HR: HCPCS

## 2023-08-26 PROCEDURE — 84703 CHORIONIC GONADOTROPIN ASSAY: CPT | Performed by: EMERGENCY MEDICINE

## 2023-08-26 PROCEDURE — 25010000002 HYDROMORPHONE PER 4 MG: Performed by: SURGERY

## 2023-08-26 PROCEDURE — 25010000002 KETOROLAC TROMETHAMINE PER 15 MG: Performed by: ANESTHESIOLOGY

## 2023-08-26 PROCEDURE — 44970 LAPAROSCOPY APPENDECTOMY: CPT | Performed by: SURGERY

## 2023-08-26 PROCEDURE — 25010000002 HYDROMORPHONE PER 4 MG: Performed by: EMERGENCY MEDICINE

## 2023-08-26 PROCEDURE — 88304 TISSUE EXAM BY PATHOLOGIST: CPT | Performed by: SURGERY

## 2023-08-26 PROCEDURE — 80053 COMPREHEN METABOLIC PANEL: CPT | Performed by: EMERGENCY MEDICINE

## 2023-08-26 PROCEDURE — 81025 URINE PREGNANCY TEST: CPT | Performed by: SURGERY

## 2023-08-26 PROCEDURE — 96375 TX/PRO/DX INJ NEW DRUG ADDON: CPT

## 2023-08-26 PROCEDURE — S0260 H&P FOR SURGERY: HCPCS | Performed by: SURGERY

## 2023-08-26 PROCEDURE — 85025 COMPLETE CBC W/AUTO DIFF WBC: CPT | Performed by: EMERGENCY MEDICINE

## 2023-08-26 PROCEDURE — 96376 TX/PRO/DX INJ SAME DRUG ADON: CPT

## 2023-08-26 PROCEDURE — 25010000002 MORPHINE PER 10 MG: Performed by: EMERGENCY MEDICINE

## 2023-08-26 PROCEDURE — 74176 CT ABD & PELVIS W/O CONTRAST: CPT

## 2023-08-26 PROCEDURE — 25010000002 CEFTRIAXONE PER 250 MG: Performed by: EMERGENCY MEDICINE

## 2023-08-26 PROCEDURE — 83690 ASSAY OF LIPASE: CPT | Performed by: EMERGENCY MEDICINE

## 2023-08-26 PROCEDURE — 99284 EMERGENCY DEPT VISIT MOD MDM: CPT

## 2023-08-26 PROCEDURE — 25010000002 DEXAMETHASONE SODIUM PHOSPHATE 20 MG/5ML SOLUTION: Performed by: ANESTHESIOLOGY

## 2023-08-26 PROCEDURE — 96361 HYDRATE IV INFUSION ADD-ON: CPT

## 2023-08-26 PROCEDURE — 25010000002 FENTANYL CITRATE (PF) 50 MCG/ML SOLUTION: Performed by: ANESTHESIOLOGY

## 2023-08-26 PROCEDURE — 96365 THER/PROPH/DIAG IV INF INIT: CPT

## 2023-08-26 PROCEDURE — 25010000002 SUGAMMADEX 200 MG/2ML SOLUTION: Performed by: ANESTHESIOLOGY

## 2023-08-26 PROCEDURE — 25010000002 ONDANSETRON PER 1 MG: Performed by: ANESTHESIOLOGY

## 2023-08-26 PROCEDURE — 25010000002 ONDANSETRON PER 1 MG: Performed by: EMERGENCY MEDICINE

## 2023-08-26 PROCEDURE — 81003 URINALYSIS AUTO W/O SCOPE: CPT | Performed by: EMERGENCY MEDICINE

## 2023-08-26 DEVICE — THE ECHELON, ECHELON ENDOPATH™ AND ECHELON FLEX™ FAMILIES OF ENDOSCOPIC LINEAR CUTTERS AND RELOADS ARE STERILE, SINGLE PATIENT USE INSTRUMENTS THAT SIMULTANEOUSLY CUT AND STAPLE TISSUE. THERE ARE SIX STAGGERED ROWS OF STAPLES, THREE ON EITHER SIDE OF THE CUT LINE. THE 45 MM INSTRUMENTS HAVE A STAPLE LINE THATIS APPROXIMATELY 45 MM LONG AND A CUT LINE THAT IS APPROXIMATELY 42 MM LONG. THE SHAFT CAN ROTATE FREELY IN BOTH DIRECTIONS AND AN ARTICULATION MECHANISM ON ARTICULATING INSTRUMENTS ENABLES BENDING THE DISTAL PORTIONOF THE SHAFT TO FACILITATE LATERAL ACCESS OF THE OPERATIVE SITE.THE INSTRUMENTS ARE SHIPPED WITHOUT A RELOAD AND MUST BE LOADED PRIOR TO USE. A STAPLE RETAINING CAP ON THE RELOAD PROTECTS THE STAPLE LEG POINTS DURING SHIPPING AND TRANSPORTATION. THE INSTRUMENTS’ LOCK-OUT FEATURE IS DESIGNED TO PREVENT A USED RELOAD FROM BEING REFIRED.
Type: IMPLANTABLE DEVICE | Site: ABDOMEN | Status: FUNCTIONAL
Brand: ECHELON ENDOPATH

## 2023-08-26 RX ORDER — PROPOFOL 10 MG/ML
VIAL (ML) INTRAVENOUS AS NEEDED
Status: DISCONTINUED | OUTPATIENT
Start: 2023-08-26 | End: 2023-08-26 | Stop reason: SURG

## 2023-08-26 RX ORDER — FENTANYL CITRATE 50 UG/ML
50 INJECTION, SOLUTION INTRAMUSCULAR; INTRAVENOUS
Status: DISCONTINUED | OUTPATIENT
Start: 2023-08-26 | End: 2023-08-26 | Stop reason: HOSPADM

## 2023-08-26 RX ORDER — IPRATROPIUM BROMIDE AND ALBUTEROL SULFATE 2.5; .5 MG/3ML; MG/3ML
3 SOLUTION RESPIRATORY (INHALATION) ONCE AS NEEDED
Status: DISCONTINUED | OUTPATIENT
Start: 2023-08-26 | End: 2023-08-26 | Stop reason: HOSPADM

## 2023-08-26 RX ORDER — FENTANYL CITRATE 50 UG/ML
50 INJECTION, SOLUTION INTRAMUSCULAR; INTRAVENOUS ONCE AS NEEDED
Status: DISCONTINUED | OUTPATIENT
Start: 2023-08-26 | End: 2023-08-26 | Stop reason: HOSPADM

## 2023-08-26 RX ORDER — ONDANSETRON 2 MG/ML
4 INJECTION INTRAMUSCULAR; INTRAVENOUS EVERY 6 HOURS PRN
Status: DISCONTINUED | OUTPATIENT
Start: 2023-08-26 | End: 2023-08-27 | Stop reason: HOSPADM

## 2023-08-26 RX ORDER — PROMETHAZINE HYDROCHLORIDE 25 MG/1
25 SUPPOSITORY RECTAL ONCE AS NEEDED
Status: DISCONTINUED | OUTPATIENT
Start: 2023-08-26 | End: 2023-08-26 | Stop reason: HOSPADM

## 2023-08-26 RX ORDER — OXYCODONE AND ACETAMINOPHEN 7.5; 325 MG/1; MG/1
1 TABLET ORAL EVERY 4 HOURS PRN
Status: DISCONTINUED | OUTPATIENT
Start: 2023-08-26 | End: 2023-08-26 | Stop reason: HOSPADM

## 2023-08-26 RX ORDER — HYDROCODONE BITARTRATE AND ACETAMINOPHEN 7.5; 325 MG/1; MG/1
1 TABLET ORAL EVERY 6 HOURS PRN
Status: DISCONTINUED | OUTPATIENT
Start: 2023-08-26 | End: 2023-08-27 | Stop reason: HOSPADM

## 2023-08-26 RX ORDER — LIDOCAINE HYDROCHLORIDE 20 MG/ML
INJECTION, SOLUTION INFILTRATION; PERINEURAL AS NEEDED
Status: DISCONTINUED | OUTPATIENT
Start: 2023-08-26 | End: 2023-08-26 | Stop reason: SURG

## 2023-08-26 RX ORDER — MIDAZOLAM HYDROCHLORIDE 1 MG/ML
1 INJECTION INTRAMUSCULAR; INTRAVENOUS
Status: DISCONTINUED | OUTPATIENT
Start: 2023-08-26 | End: 2023-08-26 | Stop reason: HOSPADM

## 2023-08-26 RX ORDER — DEXTROSE MONOHYDRATE, SODIUM CHLORIDE, AND POTASSIUM CHLORIDE 50; 1.49; 4.5 G/1000ML; G/1000ML; G/1000ML
75 INJECTION, SOLUTION INTRAVENOUS CONTINUOUS
Status: DISCONTINUED | OUTPATIENT
Start: 2023-08-26 | End: 2023-08-27 | Stop reason: HOSPADM

## 2023-08-26 RX ORDER — NALOXONE HCL 0.4 MG/ML
0.2 VIAL (ML) INJECTION AS NEEDED
Status: DISCONTINUED | OUTPATIENT
Start: 2023-08-26 | End: 2023-08-26 | Stop reason: HOSPADM

## 2023-08-26 RX ORDER — PHENYLEPHRINE HCL IN 0.9% NACL 0.5 MG/5ML
SYRINGE (ML) INTRAVENOUS AS NEEDED
Status: DISCONTINUED | OUTPATIENT
Start: 2023-08-26 | End: 2023-08-26 | Stop reason: SURG

## 2023-08-26 RX ORDER — HYDRALAZINE HYDROCHLORIDE 20 MG/ML
5 INJECTION INTRAMUSCULAR; INTRAVENOUS
Status: DISCONTINUED | OUTPATIENT
Start: 2023-08-26 | End: 2023-08-26 | Stop reason: HOSPADM

## 2023-08-26 RX ORDER — HYDROCODONE BITARTRATE AND ACETAMINOPHEN 7.5; 325 MG/1; MG/1
1 TABLET ORAL ONCE AS NEEDED
Status: DISCONTINUED | OUTPATIENT
Start: 2023-08-26 | End: 2023-08-26 | Stop reason: HOSPADM

## 2023-08-26 RX ORDER — HYDROMORPHONE HYDROCHLORIDE 1 MG/ML
0.5 INJECTION, SOLUTION INTRAMUSCULAR; INTRAVENOUS; SUBCUTANEOUS
Status: DISCONTINUED | OUTPATIENT
Start: 2023-08-26 | End: 2023-08-27 | Stop reason: HOSPADM

## 2023-08-26 RX ORDER — METRONIDAZOLE 500 MG/100ML
500 INJECTION, SOLUTION INTRAVENOUS ONCE
Status: DISCONTINUED | OUTPATIENT
Start: 2023-08-26 | End: 2023-08-27 | Stop reason: HOSPADM

## 2023-08-26 RX ORDER — EPHEDRINE SULFATE 50 MG/ML
5 INJECTION, SOLUTION INTRAVENOUS ONCE AS NEEDED
Status: DISCONTINUED | OUTPATIENT
Start: 2023-08-26 | End: 2023-08-26 | Stop reason: HOSPADM

## 2023-08-26 RX ORDER — HYDROMORPHONE HYDROCHLORIDE 1 MG/ML
0.5 INJECTION, SOLUTION INTRAMUSCULAR; INTRAVENOUS; SUBCUTANEOUS ONCE
Status: COMPLETED | OUTPATIENT
Start: 2023-08-26 | End: 2023-08-26

## 2023-08-26 RX ORDER — SODIUM CHLORIDE 0.9 % (FLUSH) 0.9 %
10 SYRINGE (ML) INJECTION AS NEEDED
Status: DISCONTINUED | OUTPATIENT
Start: 2023-08-26 | End: 2023-08-27 | Stop reason: HOSPADM

## 2023-08-26 RX ORDER — SODIUM CHLORIDE 9 MG/ML
125 INJECTION, SOLUTION INTRAVENOUS CONTINUOUS
Status: DISCONTINUED | OUTPATIENT
Start: 2023-08-26 | End: 2023-08-27

## 2023-08-26 RX ORDER — SODIUM CHLORIDE, SODIUM LACTATE, POTASSIUM CHLORIDE, CALCIUM CHLORIDE 600; 310; 30; 20 MG/100ML; MG/100ML; MG/100ML; MG/100ML
9 INJECTION, SOLUTION INTRAVENOUS CONTINUOUS
Status: DISCONTINUED | OUTPATIENT
Start: 2023-08-26 | End: 2023-08-26

## 2023-08-26 RX ORDER — MAGNESIUM HYDROXIDE 1200 MG/15ML
LIQUID ORAL AS NEEDED
Status: DISCONTINUED | OUTPATIENT
Start: 2023-08-26 | End: 2023-08-26 | Stop reason: HOSPADM

## 2023-08-26 RX ORDER — FLUMAZENIL 0.1 MG/ML
0.2 INJECTION INTRAVENOUS AS NEEDED
Status: DISCONTINUED | OUTPATIENT
Start: 2023-08-26 | End: 2023-08-26 | Stop reason: HOSPADM

## 2023-08-26 RX ORDER — MORPHINE SULFATE 2 MG/ML
4 INJECTION, SOLUTION INTRAMUSCULAR; INTRAVENOUS ONCE
Status: COMPLETED | OUTPATIENT
Start: 2023-08-26 | End: 2023-08-26

## 2023-08-26 RX ORDER — FAMOTIDINE 10 MG/ML
20 INJECTION, SOLUTION INTRAVENOUS ONCE
Status: COMPLETED | OUTPATIENT
Start: 2023-08-26 | End: 2023-08-26

## 2023-08-26 RX ORDER — LIDOCAINE HYDROCHLORIDE 10 MG/ML
0.5 INJECTION, SOLUTION INFILTRATION; PERINEURAL ONCE AS NEEDED
Status: DISCONTINUED | OUTPATIENT
Start: 2023-08-26 | End: 2023-08-26 | Stop reason: HOSPADM

## 2023-08-26 RX ORDER — LABETALOL HYDROCHLORIDE 5 MG/ML
5 INJECTION, SOLUTION INTRAVENOUS
Status: DISCONTINUED | OUTPATIENT
Start: 2023-08-26 | End: 2023-08-26 | Stop reason: HOSPADM

## 2023-08-26 RX ORDER — SODIUM CHLORIDE 0.9 % (FLUSH) 0.9 %
3-10 SYRINGE (ML) INJECTION AS NEEDED
Status: DISCONTINUED | OUTPATIENT
Start: 2023-08-26 | End: 2023-08-26 | Stop reason: HOSPADM

## 2023-08-26 RX ORDER — DROPERIDOL 2.5 MG/ML
0.62 INJECTION, SOLUTION INTRAMUSCULAR; INTRAVENOUS
Status: DISCONTINUED | OUTPATIENT
Start: 2023-08-26 | End: 2023-08-26 | Stop reason: HOSPADM

## 2023-08-26 RX ORDER — PROMETHAZINE HYDROCHLORIDE 25 MG/1
25 TABLET ORAL ONCE AS NEEDED
Status: DISCONTINUED | OUTPATIENT
Start: 2023-08-26 | End: 2023-08-26 | Stop reason: HOSPADM

## 2023-08-26 RX ORDER — ONDANSETRON 2 MG/ML
INJECTION INTRAMUSCULAR; INTRAVENOUS AS NEEDED
Status: DISCONTINUED | OUTPATIENT
Start: 2023-08-26 | End: 2023-08-26 | Stop reason: SURG

## 2023-08-26 RX ORDER — DIPHENHYDRAMINE HYDROCHLORIDE 50 MG/ML
12.5 INJECTION INTRAMUSCULAR; INTRAVENOUS
Status: DISCONTINUED | OUTPATIENT
Start: 2023-08-26 | End: 2023-08-26 | Stop reason: HOSPADM

## 2023-08-26 RX ORDER — HYDROMORPHONE HYDROCHLORIDE 1 MG/ML
0.5 INJECTION, SOLUTION INTRAMUSCULAR; INTRAVENOUS; SUBCUTANEOUS
Status: DISCONTINUED | OUTPATIENT
Start: 2023-08-26 | End: 2023-08-26 | Stop reason: HOSPADM

## 2023-08-26 RX ORDER — FENTANYL CITRATE 50 UG/ML
INJECTION, SOLUTION INTRAMUSCULAR; INTRAVENOUS AS NEEDED
Status: DISCONTINUED | OUTPATIENT
Start: 2023-08-26 | End: 2023-08-26 | Stop reason: SURG

## 2023-08-26 RX ORDER — DEXAMETHASONE SODIUM PHOSPHATE 4 MG/ML
INJECTION, SOLUTION INTRA-ARTICULAR; INTRALESIONAL; INTRAMUSCULAR; INTRAVENOUS; SOFT TISSUE AS NEEDED
Status: DISCONTINUED | OUTPATIENT
Start: 2023-08-26 | End: 2023-08-26 | Stop reason: SURG

## 2023-08-26 RX ORDER — BUPIVACAINE HYDROCHLORIDE AND EPINEPHRINE 2.5; 5 MG/ML; UG/ML
INJECTION, SOLUTION EPIDURAL; INFILTRATION; INTRACAUDAL; PERINEURAL AS NEEDED
Status: DISCONTINUED | OUTPATIENT
Start: 2023-08-26 | End: 2023-08-26 | Stop reason: HOSPADM

## 2023-08-26 RX ORDER — ROCURONIUM BROMIDE 10 MG/ML
INJECTION, SOLUTION INTRAVENOUS AS NEEDED
Status: DISCONTINUED | OUTPATIENT
Start: 2023-08-26 | End: 2023-08-26 | Stop reason: SURG

## 2023-08-26 RX ORDER — ONDANSETRON 2 MG/ML
4 INJECTION INTRAMUSCULAR; INTRAVENOUS ONCE
Status: COMPLETED | OUTPATIENT
Start: 2023-08-26 | End: 2023-08-26

## 2023-08-26 RX ORDER — SODIUM CHLORIDE 0.9 % (FLUSH) 0.9 %
3 SYRINGE (ML) INJECTION EVERY 12 HOURS SCHEDULED
Status: DISCONTINUED | OUTPATIENT
Start: 2023-08-26 | End: 2023-08-26 | Stop reason: HOSPADM

## 2023-08-26 RX ORDER — KETOROLAC TROMETHAMINE 30 MG/ML
INJECTION, SOLUTION INTRAMUSCULAR; INTRAVENOUS AS NEEDED
Status: DISCONTINUED | OUTPATIENT
Start: 2023-08-26 | End: 2023-08-26 | Stop reason: SURG

## 2023-08-26 RX ORDER — ONDANSETRON 2 MG/ML
4 INJECTION INTRAMUSCULAR; INTRAVENOUS ONCE AS NEEDED
Status: DISCONTINUED | OUTPATIENT
Start: 2023-08-26 | End: 2023-08-26 | Stop reason: HOSPADM

## 2023-08-26 RX ADMIN — LIDOCAINE HYDROCHLORIDE 50 MG: 20 INJECTION, SOLUTION INFILTRATION; PERINEURAL at 21:33

## 2023-08-26 RX ADMIN — ONDANSETRON 4 MG: 2 INJECTION INTRAMUSCULAR; INTRAVENOUS at 13:46

## 2023-08-26 RX ADMIN — HYDROMORPHONE HYDROCHLORIDE 0.5 MG: 1 INJECTION, SOLUTION INTRAMUSCULAR; INTRAVENOUS; SUBCUTANEOUS at 19:14

## 2023-08-26 RX ADMIN — SODIUM CHLORIDE, POTASSIUM CHLORIDE, SODIUM LACTATE AND CALCIUM CHLORIDE 9 ML/HR: 600; 310; 30; 20 INJECTION, SOLUTION INTRAVENOUS at 21:00

## 2023-08-26 RX ADMIN — Medication 3 ML: at 21:00

## 2023-08-26 RX ADMIN — Medication 10 ML: at 13:47

## 2023-08-26 RX ADMIN — PROPOFOL 50 MG: 10 INJECTION, EMULSION INTRAVENOUS at 21:38

## 2023-08-26 RX ADMIN — KETOROLAC TROMETHAMINE 30 MG: 30 INJECTION, SOLUTION INTRAMUSCULAR; INTRAVENOUS at 22:04

## 2023-08-26 RX ADMIN — ROCURONIUM BROMIDE 25 MG: 10 INJECTION, SOLUTION INTRAVENOUS at 21:33

## 2023-08-26 RX ADMIN — ONDANSETRON 8 MG: 2 INJECTION INTRAMUSCULAR; INTRAVENOUS at 21:38

## 2023-08-26 RX ADMIN — PROPOFOL 150 MG: 10 INJECTION, EMULSION INTRAVENOUS at 21:33

## 2023-08-26 RX ADMIN — SODIUM CHLORIDE 125 ML/HR: 9 INJECTION, SOLUTION INTRAVENOUS at 16:57

## 2023-08-26 RX ADMIN — HYDROMORPHONE HYDROCHLORIDE 0.5 MG: 1 INJECTION, SOLUTION INTRAMUSCULAR; INTRAVENOUS; SUBCUTANEOUS at 16:28

## 2023-08-26 RX ADMIN — HYDROMORPHONE HYDROCHLORIDE 0.5 MG: 1 INJECTION, SOLUTION INTRAMUSCULAR; INTRAVENOUS; SUBCUTANEOUS at 14:31

## 2023-08-26 RX ADMIN — FAMOTIDINE 20 MG: 10 INJECTION INTRAVENOUS at 21:09

## 2023-08-26 RX ADMIN — MORPHINE SULFATE 4 MG: 2 INJECTION, SOLUTION INTRAMUSCULAR; INTRAVENOUS at 13:47

## 2023-08-26 RX ADMIN — CEFTRIAXONE 2000 MG: 2 INJECTION, POWDER, FOR SOLUTION INTRAMUSCULAR; INTRAVENOUS at 17:01

## 2023-08-26 RX ADMIN — SUGAMMADEX 200 MG: 100 INJECTION, SOLUTION INTRAVENOUS at 22:10

## 2023-08-26 RX ADMIN — Medication 100 MCG: at 21:42

## 2023-08-26 RX ADMIN — SODIUM CHLORIDE 1000 ML: 9 INJECTION, SOLUTION INTRAVENOUS at 13:43

## 2023-08-26 RX ADMIN — DEXAMETHASONE SODIUM PHOSPHATE 6 MG: 4 INJECTION, SOLUTION INTRAMUSCULAR; INTRAVENOUS at 21:38

## 2023-08-26 RX ADMIN — MIDAZOLAM 1 MG: 1 INJECTION INTRAMUSCULAR; INTRAVENOUS at 21:11

## 2023-08-26 RX ADMIN — FENTANYL CITRATE 50 MCG: 50 INJECTION, SOLUTION INTRAMUSCULAR; INTRAVENOUS at 21:33

## 2023-08-26 NOTE — NURSING NOTE
Pt arrived from ED at approximately 1800. Room status was clean in computer, though the patient room, 682, was not clean. After speaking w/ the , Sera, she requested the patient stay on [address] instead of going back down to ED. No other rooms on [address] were available at this time.  suggested an aid stay with patient while room was being cleaned. Patient is currently in [facility] with NA waiting for the room to be finished cleaning.     Floor orders requested from Dr. Herron, he reports he is on his way to see the patient. I informed him of patient location at this time.     Pt updated on plan of care

## 2023-08-26 NOTE — ED NOTES
"Pt returned from CT. states nausea still intermittent and pain \"has not improved\". MD notified   "

## 2023-08-26 NOTE — ED NOTES
"Nursing report ED to floor  Mirlande Landa  32 y.o.  female    HPI :   Chief Complaint   Patient presents with    Abdominal Pain       Admitting doctor:   Germain Herron MD    Admitting diagnosis:   The encounter diagnosis was Acute appendicitis, unspecified acute appendicitis type.    Code status:   Current Code Status       Date Active Code Status Order ID Comments User Context       Not on file            Allergies:   Penicillins    Isolation:   No active isolations    Intake and Output    Intake/Output Summary (Last 24 hours) at 8/26/2023 1649  Last data filed at 8/26/2023 1433  Gross per 24 hour   Intake 1000 ml   Output --   Net 1000 ml       Weight:       08/26/23  1314   Weight: 49 kg (108 lb)       Most recent vitals:   Vitals:    08/26/23 1314 08/26/23 1315 08/26/23 1430 08/26/23 1629   BP:  116/76 121/73 112/68   Pulse: 114 93 77 91   Resp: 16  16 16   Temp: 98.8 øF (37.1 øC)      TempSrc: Tympanic      SpO2: 99%  100% 100%   Weight: 49 kg (108 lb)      Height: 152.4 cm (60\")          Active LDAs/IV Access:   Lines, Drains & Airways       Active LDAs       Name Placement date Placement time Site Days    Peripheral IV 08/26/23 1342 Left Antecubital 08/26/23  1342  Antecubital  less than 1                    Labs (abnormal labs have a star):   Labs Reviewed   COMPREHENSIVE METABOLIC PANEL - Abnormal; Notable for the following components:       Result Value    Glucose 107 (*)     Creatinine 0.52 (*)     CO2 20.8 (*)     All other components within normal limits    Narrative:     GFR Normal >60  Chronic Kidney Disease <60  Kidney Failure <15     URINALYSIS W/ MICROSCOPIC IF INDICATED (NO CULTURE) - Abnormal; Notable for the following components:    Ketones, UA 80 mg/dL (3+) (*)     All other components within normal limits    Narrative:     Urine microscopic not indicated.   CBC WITH AUTO DIFFERENTIAL - Abnormal; Notable for the following components:    WBC 17.51 (*)     RDW 12.2 (*)     Neutrophil % " 90.2 (*)     Lymphocyte % 3.9 (*)     Eosinophil % 0.0 (*)     Neutrophils, Absolute 15.80 (*)     Lymphocytes, Absolute 0.69 (*)     Monocytes, Absolute 0.92 (*)     Immature Grans, Absolute 0.08 (*)     All other components within normal limits   LIPASE - Normal   HCG, SERUM, QUALITATIVE - Normal   RAINBOW DRAW    Narrative:     The following orders were created for panel order [place] Draw.  Procedure                               Abnormality         Status                     ---------                               -----------         ------                     Green Top (Gel)[[id number]]                                  Final result               Lavender Top[[id number]]                                     Final result               Gold Top - SST[[id number]]                                   Final result               Light Blue Top[[id number]]                                   Final result                 Please view results for these tests on the individual orders.   GREEN TOP   LAVENDER TOP   GOLD TOP - SST   LIGHT BLUE TOP   CBC AND DIFFERENTIAL    Narrative:     The following orders were created for panel order CBC & Differential.  Procedure                               Abnormality         Status                     ---------                               -----------         ------                     CBC Auto Differential[[id number]]        Abnormal            Final result                 Please view results for these tests on the individual orders.       EKG:   No orders to display       Meds given in ED:   Medications   sodium chloride 0.9 % flush 10 mL (10 mL Intravenous Given 8/26/23 1347)   cefTRIAXone (ROCEPHIN) 2,000 mg in sodium chloride 0.9 % 100 mL IVPB-VTB (has no administration in time range)   metroNIDAZOLE (FLAGYL) IVPB 500 mg (has no administration in time range)   sodium chloride 0.9 % infusion (has no administration in time range)   sodium chloride 0.9 % bolus 1,000 mL (0 mL Intravenous  Stopped 8/26/23 1433)   ondansetron (ZOFRAN) injection 4 mg (4 mg Intravenous Given 8/26/23 1346)   morphine injection 4 mg (4 mg Intravenous Given 8/26/23 1347)   HYDROmorphone (DILAUDID) injection 0.5 mg (0.5 mg Intravenous Given 8/26/23 1431)   HYDROmorphone (DILAUDID) injection 0.5 mg (0.5 mg Intravenous Given 8/26/23 1628)       Imaging results:  CT Abdomen Pelvis Without Contrast    Result Date: 8/26/2023  1.  Findings of acute appendicitis. No free intraperitoneal air is seen. Please note that evaluation is suboptimal intravenous contrast. The above findings were discussed with Dr. Rodas   by telephone by Brock Shrestha at 3:55 p.m.    on   8/26/2023 . 2.  There are air-fluid levels within the large bowel with moderate distention of the cecum.. 3.  Other findings as above.  This report was finalized on 8/26/2023 4:30 PM by Dr. Brock Shrestha M.D.       Ambulatory status:   - independent     Social issues:   Social History     Socioeconomic History    Marital status:    Tobacco Use    Smoking status: Never    Smokeless tobacco: Never   Substance and Sexual Activity    Alcohol use: Never       NIH Stroke Scale:       Concha Escamilla RN  08/26/23 16:49 EDT

## 2023-08-26 NOTE — ED PROVIDER NOTES
" EMERGENCY DEPARTMENT ENCOUNTER    Room Number:  19/19  PCP: Concha Mccullough APRN  Patient Care Team:  Concha Mccullough APRN as PCP - General (Family Medicine)   Independent Historians: Patient    HPI:  Chief Complaint: Abdominal pain    A complete HPI/ROS/PMH/PSH/SH/FH are unobtainable due to: None    Chronic or social conditions impacting patient care (Social Determinants of Health): None  (Financial Resource Strain / Food Insecurity / Transportation Needs / Physical Activity / Stress / Social Connections / Intimate Partner Violence / Housing Stability)    Context: Mirlande Landa is a 32 y.o. female who presents to the ED c/o acute abdominal pain since last night.  She describes the pain as \"feeling like an obstruction\" or \"constipation x10\".  Patient had 5-6 episodes of nonbloody nonbilious emesis overnight.  Her last bowel movement was 2 days ago.  Patient has a history of Hashimoto's thyroiditis but currently not on any medication because her TSH is maintained and normal.  Patient's last menstrual period was 3 days ago and she is also breast-feeding.  Patient denies any chance of pregnancy.  Patient denies any urinary symptoms like hematuria or dysuria, denies vaginal discharge, denies any bad foods, and denies any sick contacts.      Review of prior external notes (non-ED) -and- Review of prior external test results outside of this encounter: I reviewed patient's last few primary care notes which were for sore throat in March, URI in July 2022, and general visit for fatigue, myalgias, and daytime sleepiness in August 2021.    Prescription drug monitoring program review:         PAST MEDICAL HISTORY  Active Ambulatory Problems     Diagnosis Date Noted    Vitamin D deficiency 07/11/2021    Post-COVID syndrome 07/11/2021     Resolved Ambulatory Problems     Diagnosis Date Noted    No Resolved Ambulatory Problems     Past Medical History:   Diagnosis Date    Atypical chest pain 2020 " COVID-19     Hashimoto's thyroiditis     Molar pregnancy          PAST SURGICAL HISTORY  Past Surgical History:   Procedure Laterality Date     SECTION           FAMILY HISTORY  Family History   Problem Relation Age of Onset    Cancer Father     Heart disease Maternal Grandmother     Heart disease Maternal Grandfather          SOCIAL HISTORY  Social History     Socioeconomic History    Marital status:    Tobacco Use    Smoking status: Never    Smokeless tobacco: Never   Substance and Sexual Activity    Alcohol use: Never         ALLERGIES  Penicillins        REVIEW OF SYSTEMS  Review of Systems  Included in HPI  All systems reviewed and negative except for those discussed in HPI.      PHYSICAL EXAM    I have reviewed the triage vital signs and nursing notes.    ED Triage Vitals   Temp Heart Rate Resp BP SpO2   23 1314 23 1314 23 1314 23 1315 23 1314   98.8 øF (37.1 øC) 114 16 116/76 99 %      Temp src Heart Rate Source Patient Position BP Location FiO2 (%)   23 1314 23 1314 -- -- --   Tympanic Monitor          Physical Exam  GENERAL: Pleasant cooperative and conversant  female, alert, no acute distress  SKIN: Warm, dry  HENT: Normocephalic, atraumatic  EYES: no scleral icterus  CV: regular rhythm, regular rate, no murmur  RESPIRATORY: normal effort, lungs clear, no wheezing  ABDOMEN: soft, generalized abdominal tenderness to palpation with no rebound or guarding, no organomegaly, nondistended, no CVA tenderness  MUSCULOSKELETAL: no deformity, normal gait  NEURO: alert, moves all extremities, follows commands                                                               LAB RESULTS  Recent Results (from the past 24 hour(s))   Green Top (Gel)    Collection Time: 23  1:39 PM   Result Value Ref Range    Extra Tube Hold for add-ons.    Lavender Top    Collection Time: 23  1:39 PM   Result Value Ref Range    Extra Tube hold for add-on     Gold Top - SST    Collection Time: 08/26/23  1:39 PM   Result Value Ref Range    Extra Tube Hold for add-ons.    Light Blue Top    Collection Time: 08/26/23  1:39 PM   Result Value Ref Range    Extra Tube Hold for add-ons.    Comprehensive Metabolic Panel    Collection Time: 08/26/23  1:39 PM    Specimen: Blood   Result Value Ref Range    Glucose 107 (H) 65 - 99 mg/dL    BUN 9 6 - 20 mg/dL    Creatinine 0.52 (L) 0.57 - 1.00 mg/dL    Sodium 138 136 - 145 mmol/L    Potassium 3.9 3.5 - 5.2 mmol/L    Chloride 103 98 - 107 mmol/L    CO2 20.8 (L) 22.0 - 29.0 mmol/L    Calcium 9.4 8.6 - 10.5 mg/dL    Total Protein 7.8 6.0 - 8.5 g/dL    Albumin 5.0 3.5 - 5.2 g/dL    ALT (SGPT) 16 1 - 33 U/L    AST (SGOT) 14 1 - 32 U/L    Alkaline Phosphatase 63 39 - 117 U/L    Total Bilirubin 0.5 0.0 - 1.2 mg/dL    Globulin 2.8 gm/dL    A/G Ratio 1.8 g/dL    BUN/Creatinine Ratio 17.3 7.0 - 25.0    Anion Gap 14.2 5.0 - 15.0 mmol/L    eGFR 125.2 >60.0 mL/min/1.73   Lipase    Collection Time: 08/26/23  1:39 PM    Specimen: Blood   Result Value Ref Range    Lipase 22 13 - 60 U/L   hCG, Serum, Qualitative    Collection Time: 08/26/23  1:39 PM    Specimen: Blood   Result Value Ref Range    HCG Qualitative Negative Negative   CBC Auto Differential    Collection Time: 08/26/23  1:39 PM    Specimen: Blood   Result Value Ref Range    WBC 17.51 (H) 3.40 - 10.80 10*3/mm3    RBC 4.71 3.77 - 5.28 10*6/mm3    Hemoglobin 13.5 12.0 - 15.9 g/dL    Hematocrit 40.8 34.0 - 46.6 %    MCV 86.6 79.0 - 97.0 fL    MCH 28.7 26.6 - 33.0 pg    MCHC 33.1 31.5 - 35.7 g/dL    RDW 12.2 (L) 12.3 - 15.4 %    RDW-SD 39.2 37.0 - 54.0 fl    MPV 11.1 6.0 - 12.0 fL    Platelets 200 140 - 450 10*3/mm3    Neutrophil % 90.2 (H) 42.7 - 76.0 %    Lymphocyte % 3.9 (L) 19.6 - 45.3 %    Monocyte % 5.3 5.0 - 12.0 %    Eosinophil % 0.0 (L) 0.3 - 6.2 %    Basophil % 0.1 0.0 - 1.5 %    Immature Grans % 0.5 0.0 - 0.5 %    Neutrophils, Absolute 15.80 (H) 1.70 - 7.00 10*3/mm3    Lymphocytes,  Absolute 0.69 (L) 0.70 - 3.10 10*3/mm3    Monocytes, Absolute 0.92 (H) 0.10 - 0.90 10*3/mm3    Eosinophils, Absolute 0.00 0.00 - 0.40 10*3/mm3    Basophils, Absolute 0.02 0.00 - 0.20 10*3/mm3    Immature Grans, Absolute 0.08 (H) 0.00 - 0.05 10*3/mm3    nRBC 0.0 0.0 - 0.2 /100 WBC       I ordered the above labs and independently reviewed the results.        RADIOLOGY  CT Abdomen Pelvis Without Contrast    Result Date: 8/26/2023  CT ABDOMEN AND PELVIS WITHOUT IV CONTRAST  HISTORY: Abdominal pain, acute  TECHNIQUE: Radiation dose reduction techniques were utilized, including automated exposure control and exposure modulation based on body size. 3 mm images were obtained through the abdomen and pelvis without IV contrast.  COMPARISON: None  FINDINGS: There are no findings of small bowel obstruction. The appendix is distended with multiple appendicoliths and periappendiceal fat stranding and soft tissue thickening. The appendix measures up to approximately 1.3 cm in diameter. The liver, gallbladder, pancreas, spleen and adrenal glands have an unremarkable noncontrast CT appearance. Asymmetric atrophy of the left kidney is present. There is no hydronephrosis.  No abdominal pelvic adenopathy by size criteria is seen there are air-fluid levels within the large bowel. No free intraperitoneal air is seen. Small amount of fluid is present in the pelvis  No suspicious lytic or blastic osseous lesions      1.  Findings of acute appendicitis. No free intraperitoneal air is seen. Please note that evaluation is suboptimal intravenous contrast. The above findings were discussed with Dr. Rodas   by telephone by Brock Shrestha at 3:55 p.m.    on   8/26/2023 . 2.  There are air-fluid levels within the large bowel with moderate distention of the cecum.. 3.  Other findings as above.  This report was finalized on 8/26/2023 4:30 PM by Dr. Brock Shrestha M.D.       I ordered the above noted radiological studies. Reviewed by me. See  dictation for official radiology interpretation.      PROCEDURES    Procedures      MEDICATIONS GIVEN IN ER    Medications   sodium chloride 0.9 % flush 10 mL (10 mL Intravenous Given 8/26/23 1347)   cefTRIAXone (ROCEPHIN) 2,000 mg in sodium chloride 0.9 % 100 mL IVPB-VTB (has no administration in time range)   metroNIDAZOLE (FLAGYL) IVPB 500 mg (has no administration in time range)   sodium chloride 0.9 % infusion (has no administration in time range)   sodium chloride 0.9 % bolus 1,000 mL (0 mL Intravenous Stopped 8/26/23 1433)   ondansetron (ZOFRAN) injection 4 mg (4 mg Intravenous Given 8/26/23 1346)   morphine injection 4 mg (4 mg Intravenous Given 8/26/23 1347)   HYDROmorphone (DILAUDID) injection 0.5 mg (0.5 mg Intravenous Given 8/26/23 1431)   HYDROmorphone (DILAUDID) injection 0.5 mg (0.5 mg Intravenous Given 8/26/23 1628)         ORDERS PLACED DURING THIS VISIT:  Orders Placed This Encounter   Procedures    CT Abdomen Pelvis Without Contrast    [place] Draw    Comprehensive Metabolic Panel    Lipase    hCG, Serum, Qualitative    Urinalysis With Microscopic If Indicated (No Culture) - Urine, Clean Catch    CBC Auto Differential    NPO Diet NPO Type: Strict NPO    Surgery (on-call MD unless specified)    Insert Peripheral IV    Initiate Observation Status    Green Top (Gel)    Lavender Top    Gold Top - SST    Light Blue Top    CBC & Differential         PROGRESS, DATA ANALYSIS, CONSULTS, AND MEDICAL DECISION MAKING    All labs have been independently interpreted by me.  All radiology studies have been reviewed by me.   EKG's independently viewed and interpreted by me.  Discussion below represents my analysis of pertinent findings related to patient's condition, differential diagnosis, treatment plan and final disposition.    MDM The differential diagnosis for this patient includes: appendicitis, pancreatitis, cholecystitis/biliary colic, PUD, gastritis, pneumonia, ureteral stone, sbo, hernia, colitis,  diverticulitis, AAA, malignancy, gastroenteritis, food intolerances. Abdominal exam is without peritoneal signs. There is no evidence of acute abdomen at this time. The patient is well appearing. I have a low suspicion for acute hepatobiliary disease (including acute cholecystitis), acute pancreatitis, PUD (including perforation), acute infectious processes (pneumonia, hepatitis, pyelonephritis), acute appendicitis, vascular catastrophe, bowel obstruction or viscus perforation. Plan serum labs, urinalysis, IV pain control, IMAGING with CT abdomen pelvis, and serial reassessment..    ED Course as of 08/26/23 1643   Sat Aug 26, 2023   1540 I reviewed patient's CT scan and on my interpretation patient had no free air or free fluid but did have some inflammatory changes in the right lower quadrant.  Awaiting radiologist read. [AR]   1555 I discussed the case with the radiologist who is concerned the patient has acute appendicitis on her CT scan with no obvious perforation nor abscess. [AR]   1604 I discussed the case with the patient and answered all her questions.  Awaiting surgery on-call.  Patient is penicillin allergic so we will plan Rocephin and Flagyl IV. [AR]   1640 I discussed patient's case with Dr. Herron who is amenable to admission. [AR]      ED Course User Index  [AR] Mckayla Rodas MD         PPE: I wore and adhered to appropriate PPE per hospital protocols for specific patient presentation. (For respiratory patients with suspected Covid-19 or other infectious etiology suspected for patient's symptoms, the patient wore a mask and I wore an N95 mask throughout the entire patient encounter.) Proper hand hygiene both before and after patient encounter was performed as well.         AS OF 16:43 EDT VITALS:    BP - 112/68  HR - 91  TEMP - 98.8 øF (37.1 øC) (Tympanic)  O2 SATS - 100%        DIAGNOSIS  Final diagnoses:   Acute appendicitis, unspecified acute appendicitis type         DISPOSITION  ED  Disposition       ED Disposition   Decision to Admit    Condition   --    Comment   Level of Care: Med/Surg [1]   Diagnosis: Acute appendicitis [310214]   Admitting Physician: AZEB ESPITIA [[id number]]   Attending Physician: AZEB ESPITIA [[id number]]                    Note Disclaimer: At [facility], we believe that sharing information builds trust and better relationships. You are receiving this note because you recently visited [facility]. It is possible you will see health information before a provider has talked with you about it. This kind of information can be easy to misunderstand. To help you fully understand what it means for your health, we urge you to discuss this note with your provider.         Mckayla Rodas MD  08/26/23 2351

## 2023-08-26 NOTE — ED NOTES
Pt to ED with c/o mid lower abd pain, sharp and crampy since last night, states cannot states when the last time she had a BM. Took miralax this am. Ramesh diarrhea, states + nausea/vomiting last night. Denies urinary c/o. Not actively vomiting on arrival. Denies fevers.

## 2023-08-27 ENCOUNTER — READMISSION MANAGEMENT (OUTPATIENT)
Dept: CALL CENTER | Facility: HOSPITAL | Age: 33
End: 2023-08-27
Payer: COMMERCIAL

## 2023-08-27 ENCOUNTER — DOCUMENTATION (OUTPATIENT)
Dept: LACTATION | Facility: HOSPITAL | Age: 33
End: 2023-08-27
Payer: COMMERCIAL

## 2023-08-27 VITALS
HEIGHT: 60 IN | TEMPERATURE: 97.3 F | RESPIRATION RATE: 16 BRPM | WEIGHT: 110 LBS | HEART RATE: 75 BPM | BODY MASS INDEX: 21.6 KG/M2 | SYSTOLIC BLOOD PRESSURE: 93 MMHG | OXYGEN SATURATION: 98 % | DIASTOLIC BLOOD PRESSURE: 56 MMHG

## 2023-08-27 PROCEDURE — G0378 HOSPITAL OBSERVATION PER HR: HCPCS

## 2023-08-27 RX ORDER — HYDROCODONE BITARTRATE AND ACETAMINOPHEN 7.5; 325 MG/1; MG/1
1 TABLET ORAL EVERY 6 HOURS PRN
Qty: 15 TABLET | Refills: 0 | Status: SHIPPED | OUTPATIENT
Start: 2023-08-27 | End: 2023-09-05

## 2023-08-27 RX ADMIN — POTASSIUM CHLORIDE, DEXTROSE MONOHYDRATE AND SODIUM CHLORIDE 75 ML/HR: 150; 5; 450 INJECTION, SOLUTION INTRAVENOUS at 05:45

## 2023-08-27 RX ADMIN — HYDROCODONE BITARTRATE AND ACETAMINOPHEN 1 TABLET: 7.5; 325 TABLET ORAL at 01:14

## 2023-08-27 RX ADMIN — HYDROCODONE BITARTRATE AND ACETAMINOPHEN 1 TABLET: 7.5; 325 TABLET ORAL at 06:55

## 2023-08-27 NOTE — ANESTHESIA PROCEDURE NOTES
Airway  Urgency: elective    Date/Time: 8/26/2023 9:33 PM  End Time:8/26/2023 9:36 PM  Airway not difficult    General Information and Staff    Patient location during procedure: OR  Anesthesiologist: Donis Gay MD    Indications and Patient Condition  Indications for airway management: airway protection    Preoxygenated: yes  MILS not maintained throughout  Mask difficulty assessment: 2 - vent by mask + OA or adjuvant +/- NMBA    Final Airway Details  Final airway type: endotracheal airway      Successful airway: ETT  Cuffed: yes   Successful intubation technique: direct laryngoscopy  Endotracheal tube insertion site: oral  Blade: Manas  Blade size: 3  ETT size (mm): 7.0  Cormack-Lehane Classification: grade I - full view of glottis  Placement verified by: chest auscultation and capnometry   Cuff volume (mL): 7  Measured from: teeth  ETT/EBT  to teeth (cm): 19  Number of attempts at approach: 1  Assessment: lips, teeth, and gum same as pre-op and atraumatic intubation    Additional Comments  Smooth IV induction, eyes taped, EZ mask with OAW, DL, ETT placed/secured, ETCO2>20x6.

## 2023-08-27 NOTE — PLAN OF CARE
Goal Outcome Evaluation:  Plan of Care Reviewed With: patient           Outcome Evaluation: temp 100.8 at 0230, norco given for pain, , Ice pack to abd. Lap sites x3, voided freely, IV fluids cont.

## 2023-08-27 NOTE — OP NOTE
Operative Note :   Germain Herron MD Amricardo Campbellault  1990    Procedure Date: 08/26/23    Pre-op Diagnosis:  Acute appendicitis, unspecified acute appendicitis type [K35.80]    Post-Op Diagnosis:  Same    Procedure:   Laparoscopic appendectomy    Surgeon: Germain Herron MD    Assistant: None    Anesthesia:  General (general endotracheal tube)    EBL:   minimal    Specimens:   Appendix    Indications:  32-year-old female presenting with acute appendicitis    Findings:   Evidence of acute nonperforated appendicitis    Recommendations:   Routine post appendectomy care    Description of procedure:    After obtaining informed consent, the patient was brought to the operating room and placed under a general anesthetic.  Her abdomen was sterilely prepped and draped.  Supraumbilical incision made dissected through the skin and subcutaneous tissue onto the fascia.  Fascia was incised in the midline.  #1 Vicryl suture was placed on each side of the fascia in a U stitch pattern.  The peritoneum was bluntly penetrated and Boyer trocar was introduced.  The abdomen was insufflated.  Initial inspection demonstrated no evidence of injury.  Additional 5 mm trocars were placed, one in the right upper quadrant and 1 in the left lower quadrant.  Patient position with her head down and right side up.  The cecum was identified and rotated medially and superiorly.  The appendix was adherent against the retroperitoneum extending down into the pelvis but was able to be bluntly  and lifted up.  It was obviously acutely inflamed, but the base was relatively healthy appearing.  The Enseal device was used to divide the mesial appendix.  The [place] stapler was then fired across the base of the appendix with a white load.  The appendix was placed in an Endo Catch bag and removed.  The abdomen was thoroughly irrigated.  Trocars were withdrawn.  The abdomen was desufflated.  Midline supraumbilical fascial incision was  reapproximated with 0 Vicryl.  Skin incisions were closed with 4-0 Monocryl.    Germian Herron MD  General and Endoscopic Surgery  [facility]    [address], [address]  [place], [place], [address]  P: [phone]  F: [phone]

## 2023-08-27 NOTE — OUTREACH NOTE
Prep Survey      Flowsheet Row Responses   [facility] patient discharged from? Onset   Is LACE score < 7 ? Yes   Eligibility [facility]   Date of Admission 08/26/23   Date of Discharge 08/27/23   Discharge Disposition Home or Self Care   Discharge diagnosis laparoscopic, possible open appendectomy,   Does the patient have one of the following disease processes/diagnoses(primary or secondary)? General Surgery   Does the patient have Home health ordered? No   Is there a DME ordered? No   Prep survey completed? Yes            CORY BAZZI - Registered Nurse

## 2023-08-27 NOTE — H&P
Cc: Abdominal pain    History of presenting illness:   This is a very nice, reasonably healthy 32-year-old female who began having abdominal pain last night.  The pain was predominantly in the lower abdomen, not necessarily more on the right or left.  The pain was made worse with movement.  It was associated with some nausea and vomiting and subjective fever, prompting her to come to the emergency department.  Evaluation here demonstrated a leukocytosis and concern for acute appendicitis based on CT findings.    Past Medical History: Hashimoto's thyroiditis    Past Surgical History:  section x3    Medications: None chronically    Allergies: Penicillins caused a rash in the past    Social History: She is a non-smoker    Family History: Negative for colon or rectal cancer    Review of Systems:  Constitutional: Positive for subjective fever and decreased appetite, denies change in weight  Neck: no swollen glands or dysphagia or odynophagia  Respiratory: negative for SOB, cough, hemoptysis or wheezing  Cardiovascular: negative for chest pain, palpitations or peripheral edema  Gastrointestinal: Positive for abdominal pain and nausea      Physical Exam:  BMI: Temperature 100.0 heart rate 90 blood pressure 129/69  General: alert and oriented, appropriate, no acute distress  Eyes: No scleral icterus, extraocular movements are intact  Neck: Supple without lymphadenopathy or thyromegaly, trachea is in the midline  Respiratory: There is good bilateral chest expansion, no use of accessory muscles is noted  Cardiovascular: No jugular venous distention or peripheral edema is seen  Gastrointestinal: Soft, there is mild diffuse tenderness, more prominent in the lower abdomen without true guarding.  No hernia is felt.    Laboratory data: White blood cell count 17.5 hemoglobin is 13.5 chemistries are in reasonable order    Imaging data: CT demonstrates evidence of acute appendicitis with no clear evidence of abscess or  perforation.  The appendix is grossly dilated and there are multiple appendicoliths noted.      Assessment and plan:   -Acute appendicitis with appendicoliths  -Options discussed with patient.  I have recommended proceeding with laparoscopic, possible open appendectomy, risks including bleeding, possible need for further revisional perforation, conversion to open surgery, abscess and other potential complications discussed, patient is agreeable to proceed as outlined.      Germain Herron MD, FACS  General, Minimally Invasive and Endoscopic Surgery  [facility]    [address], [address]  [place], [place], [address]  P: [phone]  F: [phone]

## 2023-08-27 NOTE — ANESTHESIA POSTPROCEDURE EVALUATION
"Patient: Mirlande Landa    Procedure Summary       Date: 08/26/23 Room / Location: [facility] OR  / [facility] MAIN OR    Anesthesia Start: 2128 Anesthesia Stop: 2230    Procedure: Laparoscopic appendectomy, possible open (Abdomen) Diagnosis:       Acute appendicitis, unspecified acute appendicitis type      (Acute appendicitis, unspecified acute appendicitis type [K35.80])    Surgeons: Germain Herron MD Provider: Donis Gay MD    Anesthesia Type: general ASA Status: 2 - Emergent            Anesthesia Type: general    Vitals  Vitals Value Taken Time   /56 08/26/23 2300   Temp 36.8 øC (98.3 øF) 08/26/23 2226   Pulse 100 08/26/23 2311   Resp 20 08/26/23 2300   SpO2 100 % 08/26/23 2311   Vitals shown include unvalidated device data.        Post Anesthesia Care and Evaluation    Patient location during evaluation: bedside  Patient participation: complete - patient participated  Level of consciousness: awake and alert  Pain score: 0  Pain management: adequate    Airway patency: patent  Anesthetic complications: No anesthetic complications    Cardiovascular status: acceptable  Respiratory status: acceptable  Hydration status: acceptable    Comments: /56 (BP Location: Right arm, Patient Position: Lying)   Pulse 82   Temp 36.8 øC (98.3 øF) (Oral)   Resp 20   Ht 152.4 cm (60\")   Wt 49.9 kg (110 lb)   LMP 08/22/2023   SpO2 100%   BMI 21.48 kg/mý     "

## 2023-08-27 NOTE — PROGRESS NOTES
Pt reports she had appendectomy last night. She is 10 months PP and needs to pump her breasts. Gave pt hand pump with instructions on use and cleaning. Pt reports she will pump and dump for 24 hours post surgery. She may be going home today. Encouraged pt to call [facility] if she has any questions about medications and BF

## 2023-08-27 NOTE — DISCHARGE SUMMARY
Discharge Summary    Patient name: Mirlande Landa    Medical record number: [id number]    Admission date: 8/26/2023  Discharge date: 8/27/2023    Attending physician: Germain Herron MD    Primary care physician: Concha Mccullough APRN    Consulting physician(s): None    Condition on discharge: improving    Primary Diagnoses: Acute appendicitis    Secondary Diagnoses: None    Operative Procedure: Laparoscopic appendectomy    Hospital Course: The patient is a  32 y.o. female that was admitted to the hospital with acute appendicitis.  She underwent uncomplicated laparoscopic appendectomy, was observed overnight, feeling well the next morning and discharged home doing well.    Discharge medications:      Discharge Medications        New Medications        Instructions Start Date   HYDROcodone-acetaminophen 7.5-325 MG per tablet  Commonly known as: NORCO   1 tablet, Oral, Every 6 Hours PRN             Continue These Medications        Instructions Start Date   azithromycin 250 MG tablet  Commonly known as: Zithromax   Take 2 tablets on day 1, then 1 tablet daily until complete      vitamin D 1.25 MG (90702 UT) capsule capsule  Commonly known as: ERGOCALCIFEROL   50,000 Units, Oral, Weekly               Discharge instructions: No lifting over 10 pounds for 2 weeks.  Gradually increase diet.      Follow-up appointment: Follow up with Germain Herron MD in the office in 2 weeks. Call for appointment at [phone].

## 2023-08-27 NOTE — ANESTHESIA PREPROCEDURE EVALUATION
Anesthesia Evaluation     Patient summary reviewed and Nursing notes reviewed   NPO Solid Status: > 8 hours  NPO Liquid Status: > 2 hours           Airway   Mallampati: II  TM distance: >3 FB  Neck ROM: full  no difficulty expected  Dental - normal exam     Pulmonary - normal exam   (-) COPD, asthma, not a smoker, lung cancer  Cardiovascular - normal exam  Exercise tolerance: good (4-7 METS)    Rhythm: regular  Rate: normal    (-) hypertension, valvular problems/murmurs, past MI, CAD, dysrhythmias, angina, CHF, cardiac stents, pericardial effusion      Neuro/Psych  (-) seizures, TIA, CVA  GI/Hepatic/Renal/Endo    (+) thyroid problem   (-) hiatal hernia, GERD, PUD, hepatitis, liver disease, no renal disease, diabetes, GI bleed    ROS Comment: Hashimoto's thyroiditis    Musculoskeletal     Abdominal  - normal exam   Substance History      OB/GYN    (-)  Pregnant        Other                        Anesthesia Plan    ASA 2 - emergent     general     (GETA.  Pt delivered term infant 10/31/2022 via c/s, continues to breastfeed currently.)  intravenous induction     Anesthetic plan, risks, benefits, and alternatives have been provided, discussed and informed consent has been obtained with: patient.      CODE STATUS:    Code Status (Patient has no pulse and is not breathing): CPR (Attempt to Resuscitate)  Medical Interventions (Patient has pulse or is breathing): Full Support

## 2023-08-28 ENCOUNTER — TRANSITIONAL CARE MANAGEMENT TELEPHONE ENCOUNTER (OUTPATIENT)
Dept: CALL CENTER | Facility: HOSPITAL | Age: 33
End: 2023-08-28
Payer: COMMERCIAL

## 2023-08-28 NOTE — OUTREACH NOTE
Call Center TCM Note      Flowsheet Row Responses   [facility] patient discharged from? [place]   Does the patient have one of the following disease processes/diagnoses(primary or secondary)? General Surgery   TCM attempt successful? Yes   Call start time 1344   Call end time 1345   Discharge diagnosis laparoscopic, possible open appendectomy,   Does the patient have all medications related to this admission filled (includes all antibiotics, pain medications, etc.) Yes   Is the patient taking all medications as directed (includes completed medication regime)? Yes   Does the patient have an appointment with their PCP within 7-14 days of discharge? No   Nursing Interventions Patient desires to follow up with specialty only   Has home health visited the patient within 72 hours of discharge? N/A   Psychosocial issues? No   Did the patient receive a copy of their discharge instructions? Yes   Nursing interventions Reviewed instructions with patient   What is the patient's perception of their health status since discharge? Improving   Nursing interventions Nurse provided patient education  [advised to follow discharge instructions]   Is the patient/caregiver able to teach back signs and symptoms of incisional infection? Fever, Pus or odor from incision, Incisional warmth, Increased drainage or bleeding, Increased redness, swelling or pain at the incisonal site   Is the patient/caregiver able to teach back the hierarchy of who to call/visit for symptoms/problems? PCP, Specialist, Home health nurse, Urgent Care, ED, 911 Yes   TCM call completed? Yes   Wrap up additional comments Doing well, no questions, she will be following up with her surgeon.   Call end time 1345   Would this patient benefit from a Referral to Amb Social Work? No   Is the patient interested in additional calls from an ambulatory ? No            Valeria Adams RN    8/28/2023, 13:46 EDT

## 2023-08-28 NOTE — PROGRESS NOTES
Case Management Discharge Note      Final Note: Discharged home. Nicole Layton RN         Selected Continued Care - Discharged on 8/27/2023 Admission date: 8/26/2023 - Discharge disposition: Home or Self Care       Transportation Services  Private: Car    Final Discharge Disposition Code: 01 - home or self-care

## 2023-08-29 LAB
LAB AP CASE REPORT: NORMAL
PATH REPORT.FINAL DX SPEC: NORMAL
PATH REPORT.GROSS SPEC: NORMAL

## 2023-09-06 ENCOUNTER — OFFICE VISIT (OUTPATIENT)
Dept: SURGERY | Facility: CLINIC | Age: 33
End: 2023-09-06
Payer: COMMERCIAL

## 2023-09-06 VITALS
HEIGHT: 60 IN | BODY MASS INDEX: 21.24 KG/M2 | SYSTOLIC BLOOD PRESSURE: 107 MMHG | DIASTOLIC BLOOD PRESSURE: 62 MMHG | WEIGHT: 108.2 LBS

## 2023-09-06 DIAGNOSIS — K35.80 ACUTE APPENDICITIS, UNSPECIFIED ACUTE APPENDICITIS TYPE: Primary | ICD-10-CM

## 2023-09-06 PROCEDURE — 99024 POSTOP FOLLOW-UP VISIT: CPT | Performed by: SURGERY

## 2023-09-06 PROCEDURE — 1159F MED LIST DOCD IN RCRD: CPT | Performed by: SURGERY

## 2023-09-06 PROCEDURE — 1160F RVW MEDS BY RX/DR IN RCRD: CPT | Performed by: SURGERY

## 2023-09-06 NOTE — PROGRESS NOTES
Postop laparoscopic appendectomy    Subjective:  Feels well, really no pain, eating well.    Objective:  BMI 21.1, weight 108 pounds  General: Awake and alert without distress  Abdomen: Soft and benign, incisions are in good order, no evidence for infection    Pathology reviewed with patient demonstrates necrotizing appendicitis without further complicating feature    Assessment and plan:  -Postop laparoscopic appendectomy for acute appendicitis without perforation  -Patient is recovering well to this point  -Activity as tolerated 2 weeks postop  -Follow-up here as needed    Germain Herron MD  General and Endoscopic Surgery  [facility]    [address] [place] [address], [address]  [place], [place], [address]  P: [phone]  F: [phone]

## 2023-09-06 NOTE — LETTER
September 6, 2023     ROVERTO Estrella  [address]  [address]  [facility] [address]    Patient: Mirlande Landa   YOB: 1990   Date of Visit: 9/6/2023     Dear ROVERTO Estrella:       Thank you for referring Mirlande Landa to me for evaluation. Below are the relevant portions of my assessment and plan of care.    If you have questions, please do not hesitate to call me. I look forward to following Mirlande along with you.         Sincerely,        Germain Herron MD        CC: No Recipients    Germain Herron MD  09/06/23 1406  Sign when Signing Visit  Postop laparoscopic appendectomy    Subjective:  Feels well, really no pain, eating well.    Objective:  BMI 21.1, weight 108 pounds  General: Awake and alert without distress  Abdomen: Soft and benign, incisions are in good order, no evidence for infection    Pathology reviewed with patient demonstrates necrotizing appendicitis without further complicating feature    Assessment and plan:  -Postop laparoscopic appendectomy for acute appendicitis without perforation  -Patient is recovering well to this point  -Activity as tolerated 2 weeks postop  -Follow-up here as needed    Germain Herron MD  General and Endoscopic Surgery  [facility]    [address], [address]  [place], [place], [address]  P: [phone]  F: [phone]

## 2025-03-19 PROBLEM — K35.80 ACUTE APPENDICITIS: Status: RESOLVED | Noted: 2023-08-26 | Resolved: 2025-03-19

## 2025-03-19 NOTE — PROGRESS NOTES
Subjective   Chief Complaint   Patient presents with    Fatigue    Chest Pain       History of Present Illness   34 y.o. female presents with cc fatigue; she has not been seen by myself since July 2022. Appendectomy with Dr. Herron in 2023. Post-COVID improved a lot since last seen in 2022. She has been seeing a naturopath Ismael Kay in [place] also for post COVID symptoms with great improvement. Notices fatigue is worsening again. 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 Baby not sleeping well. Reports chest pain has recurred; worse with stress and fatigue. Notices shoulder pain worse with weight bearing. Sometimes fel weak. No rash. Hips are ok. NKI. No rash on her shoulders or upper back; no redness or swelling in her joints. Unable to identify any exacerbating or relieving factors.      Patient Active Problem List   Diagnosis    Vitamin D deficiency    Post-COVID syndrome       Allergies   Allergen Reactions    Penicillins Rash       No current outpatient medications on file prior to visit.     No current facility-administered medications on file prior to visit.       Past Medical History:   Diagnosis Date    Atypical chest pain 2020    Hashimoto's thyroiditis     Molar pregnancy     Postpartum depression        Family History   Problem Relation Age of Onset    Mitral valve prolapse Mother         valve repair    Cancer Father     Heart disease Maternal Grandmother     Heart disease Maternal Grandfather        Social History     Socioeconomic History    Marital status:    Tobacco Use    Smoking status: Never    Smokeless tobacco: Never   Vaping Use    Vaping status: Never Used   Substance and Sexual Activity    Alcohol use: Never    Drug use: Not Currently    Sexual activity: Yes       Past Surgical History:   Procedure Laterality Date    APPENDECTOMY N/A 8/26/2023    Procedure: Laparoscopic appendectomy, possible open;  Surgeon: Germain Herron MD;  Location: [facility]  "MAIN OR;  Service: General;  Laterality: N/A;     SECTION         The following portions of the patient's history were reviewed and updated as appropriate: problem list, allergies, current medications, past medical history, past family history, past social history, and past surgical history.    Review of Systems    Immunization History   Administered Date(s) Administered    Flu Vaccine Quad PF >36MO 2018    Rho (D) Immune Globulin 2018, 09/15/2018, 09/10/2019, 2019    Tdap 2018, 2022       Objective   Vitals:    25 1127   BP: 110/72   Pulse: 80   Resp: 14   Temp: 97 °F (36.1 °C)   Weight: 52.2 kg (115 lb)   Height: 152.4 cm (60\")     Body mass index is 22.46 kg/m².  Physical Exam  Vitals reviewed.   Constitutional:       Appearance: Normal appearance. She is well-developed and normal weight.   HENT:      Head: Normocephalic and atraumatic.      Mouth/Throat:      Mouth: Mucous membranes are moist.      Pharynx: Oropharynx is clear. No oropharyngeal exudate or posterior oropharyngeal erythema.   Neck:      Thyroid: No thyroid mass, thyromegaly or thyroid tenderness.   Cardiovascular:      Rate and Rhythm: Normal rate and regular rhythm.      Heart sounds: Normal heart sounds, S1 normal and S2 normal.   Pulmonary:      Effort: Pulmonary effort is normal.      Breath sounds: Normal breath sounds.   Abdominal:      General: Bowel sounds are normal. There is no distension.      Palpations: Abdomen is soft.      Tenderness: There is no abdominal tenderness.   Musculoskeletal:      Cervical back: Neck supple.      Comments: Ambulates without assistance; no clubbing, cyanosis or edema. MCPS, PIPs, DIPs without erythema, swelling. NTTP.    Lymphadenopathy:      Cervical: No cervical adenopathy.   Skin:     General: Skin is warm and dry.   Neurological:      Mental Status: She is alert.   Psychiatric:         Mood and Affect: Mood normal.         Behavior: Behavior normal. "           ECG 12 Lead    Date/Time: 3/21/2025 11:57 AM  Performed by: Concha Mccullough APRN    Authorized by: Concha Mccullough APRN  Previous ECG: no previous ECG available  Rhythm: sinus rhythm  Rate: normal  BPM: 66  ST Segments: ST segments normal  T Waves: T waves normal  QRS axis: normal    Clinical impression: normal ECG          Assessment & Plan   Diagnoses and all orders for this visit:    1. Fatigue, unspecified type (Primary)  Comments:  Likely multifactorial. Labs as below.  Orders:  -     CBC & Differential  -     Comprehensive Metabolic Panel  -     TSH Rfx On Abnormal To Free T4    2. Vitamin D deficiency  -     Vitamin D,25-Hydroxy    3. Chest pain, unspecified type  Comments:  EKG is normal. CXR today. Worse with stress and fatigue.  Orders:  -     XR Chest 2 View  -     ECG 12 Lead    4. Pain of both shoulder joints  Comments:  Readdress in follow up in 1M.  Orders:  -     C-reactive Protein  -     Sedimentation Rate  -     MARCELA Direct Reflex to 11 Biomarker    Recent records reviewed.     Return in about 1 month (around 4/21/2025) for 30.

## 2025-03-21 ENCOUNTER — OFFICE VISIT (OUTPATIENT)
Dept: INTERNAL MEDICINE | Facility: CLINIC | Age: 35
End: 2025-03-21
Payer: COMMERCIAL

## 2025-03-21 VITALS
TEMPERATURE: 97 F | SYSTOLIC BLOOD PRESSURE: 110 MMHG | HEIGHT: 60 IN | BODY MASS INDEX: 22.58 KG/M2 | WEIGHT: 115 LBS | DIASTOLIC BLOOD PRESSURE: 72 MMHG | RESPIRATION RATE: 14 BRPM | HEART RATE: 80 BPM

## 2025-03-21 DIAGNOSIS — R53.83 FATIGUE, UNSPECIFIED TYPE: Primary | ICD-10-CM

## 2025-03-21 DIAGNOSIS — M25.511 PAIN OF BOTH SHOULDER JOINTS: ICD-10-CM

## 2025-03-21 DIAGNOSIS — E55.9 VITAMIN D DEFICIENCY: ICD-10-CM

## 2025-03-21 DIAGNOSIS — M25.512 PAIN OF BOTH SHOULDER JOINTS: ICD-10-CM

## 2025-03-21 DIAGNOSIS — R07.9 CHEST PAIN, UNSPECIFIED TYPE: ICD-10-CM

## 2025-03-21 PROCEDURE — 93000 ELECTROCARDIOGRAM COMPLETE: CPT | Performed by: NURSE PRACTITIONER

## 2025-03-21 PROCEDURE — 99214 OFFICE O/P EST MOD 30 MIN: CPT | Performed by: NURSE PRACTITIONER

## 2025-03-21 PROCEDURE — 1159F MED LIST DOCD IN RCRD: CPT | Performed by: NURSE PRACTITIONER

## 2025-03-21 PROCEDURE — 1160F RVW MEDS BY RX/DR IN RCRD: CPT | Performed by: NURSE PRACTITIONER

## 2025-03-22 LAB
25(OH)D3+25(OH)D2 SERPL-MCNC: 34.8 NG/ML (ref 30–100)
ALBUMIN SERPL-MCNC: 4.5 G/DL (ref 3.5–5.2)
ALBUMIN/GLOB SERPL: 1.8 G/DL
ALP SERPL-CCNC: 59 U/L (ref 39–117)
ALT SERPL-CCNC: 13 U/L (ref 1–33)
AST SERPL-CCNC: 14 U/L (ref 1–32)
BASOPHILS # BLD AUTO: 0.04 10*3/MM3 (ref 0–0.2)
BASOPHILS NFR BLD AUTO: 0.7 % (ref 0–1.5)
BILIRUB SERPL-MCNC: 0.3 MG/DL (ref 0–1.2)
BUN SERPL-MCNC: 8 MG/DL (ref 6–20)
BUN/CREAT SERPL: 12.7 (ref 7–25)
CALCIUM SERPL-MCNC: 9.2 MG/DL (ref 8.6–10.5)
CHLORIDE SERPL-SCNC: 102 MMOL/L (ref 98–107)
CO2 SERPL-SCNC: 25.9 MMOL/L (ref 22–29)
CREAT SERPL-MCNC: 0.63 MG/DL (ref 0.57–1)
EGFRCR SERPLBLD CKD-EPI 2021: 118.1 ML/MIN/1.73
EOSINOPHIL # BLD AUTO: 0.29 10*3/MM3 (ref 0–0.4)
EOSINOPHIL NFR BLD AUTO: 5 % (ref 0.3–6.2)
ERYTHROCYTE [DISTWIDTH] IN BLOOD BY AUTOMATED COUNT: 12.1 % (ref 12.3–15.4)
GLOBULIN SER CALC-MCNC: 2.5 GM/DL
GLUCOSE SERPL-MCNC: 77 MG/DL (ref 65–99)
HCT VFR BLD AUTO: 38.8 % (ref 34–46.6)
HGB BLD-MCNC: 12.3 G/DL (ref 12–15.9)
IMM GRANULOCYTES # BLD AUTO: 0.02 10*3/MM3 (ref 0–0.05)
IMM GRANULOCYTES NFR BLD AUTO: 0.3 % (ref 0–0.5)
LYMPHOCYTES # BLD AUTO: 1.9 10*3/MM3 (ref 0.7–3.1)
LYMPHOCYTES NFR BLD AUTO: 32.5 % (ref 19.6–45.3)
MCH RBC QN AUTO: 27.5 PG (ref 26.6–33)
MCHC RBC AUTO-ENTMCNC: 31.7 G/DL (ref 31.5–35.7)
MCV RBC AUTO: 86.6 FL (ref 79–97)
MONOCYTES # BLD AUTO: 0.35 10*3/MM3 (ref 0.1–0.9)
MONOCYTES NFR BLD AUTO: 6 % (ref 5–12)
NEUTROPHILS # BLD AUTO: 3.25 10*3/MM3 (ref 1.7–7)
NEUTROPHILS NFR BLD AUTO: 55.5 % (ref 42.7–76)
NRBC BLD AUTO-RTO: 0 /100 WBC (ref 0–0.2)
PLATELET # BLD AUTO: 194 10*3/MM3 (ref 140–450)
POTASSIUM SERPL-SCNC: 4.4 MMOL/L (ref 3.5–5.2)
PROT SERPL-MCNC: 7 G/DL (ref 6–8.5)
RBC # BLD AUTO: 4.48 10*6/MM3 (ref 3.77–5.28)
SODIUM SERPL-SCNC: 139 MMOL/L (ref 136–145)
TSH SERPL DL<=0.005 MIU/L-ACNC: 2.15 UIU/ML (ref 0.27–4.2)
WBC # BLD AUTO: 5.85 10*3/MM3 (ref 3.4–10.8)

## (undated) DEVICE — LAPAROSCOPIC SMOKE FILTRATION SYSTEM: Brand: PALL LAPAROSHIELD® PLUS LAPAROSCOPIC SMOKE FILTRATION SYSTEM

## (undated) DEVICE — TBG PENCL TELESCP MEGADYNE SMOKE EVAC 10FT

## (undated) DEVICE — LOU LAP CHOLE: Brand: MEDLINE INDUSTRIES, INC.

## (undated) DEVICE — TRAP FLD MINIVAC MEGADYNE 100ML

## (undated) DEVICE — LAPAROVUE VISIBILITY SYSTEM LAPAROSCOPIC SOLUTIONS: Brand: LAPAROVUE

## (undated) DEVICE — ENDOPATH XCEL BLUNT TIP TROCARS WITH SMOOTH SLEEVES: Brand: ENDOPATH XCEL

## (undated) DEVICE — ENDOPOUCH RETRIEVER SPECIMEN RETRIEVAL BAGS: Brand: ENDOPOUCH RETRIEVER

## (undated) DEVICE — ENDOCUT SCISSOR TIP, DISPOSABLE: Brand: RENEW

## (undated) DEVICE — APPL CHLORAPREP HI/LITE 26ML ORNG

## (undated) DEVICE — VIOLET BRAIDED (POLYGLACTIN 910), SYNTHETIC ABSORBABLE SUTURE: Brand: COATED VICRYL

## (undated) DEVICE — SUT MNCRYL PLS ANTIB UD 4/0 PS2 18IN

## (undated) DEVICE — ENDOPATH XCEL BLADELESS TROCARS WITH STABILITY SLEEVES: Brand: ENDOPATH XCEL

## (undated) DEVICE — ENDOPATH XCEL UNIVERSAL TROCAR STABLILITY SLEEVES: Brand: ENDOPATH XCEL

## (undated) DEVICE — DISPOSABLE MONOPOLAR ENDOSCOPIC CORD 10 FT. (3M): Brand: KIRWAN

## (undated) DEVICE — ECHELON FLEX45 ENDOPATH STAPLER, ARTICULATING ENDOSCOPIC LINEAR CUTTER (NO CARTRIDGE): Brand: ECHELON ENDOPATH

## (undated) DEVICE — ENSEAL TRIO TEMPERATURE CONTOLLED TISSUE SEALING TECHNOLOGY DISPOSABLE TISSUE SEALING DEVICE TAPTRONIC TRIGGER ACTIVATED POWER 3MM CURVED JAW: Brand: ENSEAL

## (undated) DEVICE — GLV SURG BIOGEL LTX PF 8 1/2

## (undated) DEVICE — 3M™ STERI-STRIP™ COMPOUND BENZOIN TINCTURE 40 BAGS/CARTON 4 CARTONS/CASE C1544: Brand: 3M™ STERI-STRIP™